# Patient Record
Sex: MALE | Race: OTHER | URBAN - METROPOLITAN AREA
[De-identification: names, ages, dates, MRNs, and addresses within clinical notes are randomized per-mention and may not be internally consistent; named-entity substitution may affect disease eponyms.]

---

## 2017-12-18 ENCOUNTER — OFFICE VISIT (OUTPATIENT)
Dept: PODIATRY CLINIC | Facility: CLINIC | Age: 52
End: 2017-12-18

## 2017-12-18 DIAGNOSIS — M72.2 PLANTAR FASCIITIS OF LEFT FOOT: Primary | ICD-10-CM

## 2017-12-18 PROCEDURE — L3060 FOOT ARCH SUPP LONGITUD/META: HCPCS | Performed by: PODIATRIST

## 2017-12-18 PROCEDURE — 99203 OFFICE O/P NEW LOW 30 MIN: CPT | Performed by: PODIATRIST

## 2017-12-18 PROCEDURE — 99212 OFFICE O/P EST SF 10 MIN: CPT | Performed by: PODIATRIST

## 2017-12-18 RX ORDER — ATORVASTATIN CALCIUM 20 MG/1
TABLET, FILM COATED ORAL
Refills: 2 | COMMUNITY
Start: 2017-12-16

## 2017-12-18 RX ORDER — IBUPROFEN 800 MG/1
TABLET ORAL
Refills: 0 | COMMUNITY
Start: 2017-11-11

## 2017-12-18 RX ORDER — PIOGLITAZONEHYDROCHLORIDE 15 MG/1
TABLET ORAL
Refills: 2 | COMMUNITY
Start: 2017-12-16

## 2017-12-18 RX ORDER — INFLUENZA VIRUS VACCINE 15; 15; 15; 15 UG/.5ML; UG/.5ML; UG/.5ML; UG/.5ML
SUSPENSION INTRAMUSCULAR
Refills: 0 | COMMUNITY
Start: 2017-12-01

## 2017-12-18 RX ORDER — DICLOFENAC POTASSIUM 50 MG/1
TABLET, FILM COATED ORAL
Refills: 0 | COMMUNITY
Start: 2017-10-30

## 2017-12-18 RX ORDER — IBUPROFEN 800 MG/1
800 TABLET ORAL
Qty: 60 TABLET | Refills: 1 | Status: SHIPPED | OUTPATIENT
Start: 2017-12-18 | End: 2018-01-10

## 2017-12-18 NOTE — PROGRESS NOTES
HPI:    Patient ID: Dima Gonzalez is a 46year old male. HPI  This 49-year-old male presents as a new patient to me and states that he is self-referred. His chief complaint is a painful left heel that has been present for about 3 months.   The pain is un medication, potential concerns and expectations. He was also instructed to ice twice every evening for 15 minutes. Patient indicates an understanding of my instructions I plan to reevaluate this patient in approximately 3 weeks with other considerations.

## 2018-01-10 ENCOUNTER — OFFICE VISIT (OUTPATIENT)
Dept: PODIATRY CLINIC | Facility: CLINIC | Age: 53
End: 2018-01-10

## 2018-01-10 DIAGNOSIS — M72.2 PLANTAR FASCIITIS OF LEFT FOOT: Primary | ICD-10-CM

## 2018-01-10 PROCEDURE — 99212 OFFICE O/P EST SF 10 MIN: CPT | Performed by: PODIATRIST

## 2018-01-10 RX ORDER — IBUPROFEN 800 MG/1
800 TABLET ORAL
Qty: 60 TABLET | Refills: 1 | Status: SHIPPED | OUTPATIENT
Start: 2018-01-10

## 2018-01-10 NOTE — PROGRESS NOTES
HPI:    Patient ID: Nida Molina is a 46year old male. HPI  This 35-year-old male presents for follow-up in reference to left arch and heel pain. He probably is half better as he would state it.   When he takes the medication as directed he has no pain

## 2018-03-27 ENCOUNTER — OFFICE VISIT (OUTPATIENT)
Dept: PODIATRY CLINIC | Facility: CLINIC | Age: 53
End: 2018-03-27

## 2018-03-27 VITALS — RESPIRATION RATE: 16 BRPM | DIASTOLIC BLOOD PRESSURE: 73 MMHG | SYSTOLIC BLOOD PRESSURE: 118 MMHG | HEART RATE: 82 BPM

## 2018-03-27 DIAGNOSIS — M72.2 PLANTAR FASCIITIS OF LEFT FOOT: Primary | ICD-10-CM

## 2018-03-27 PROCEDURE — 99213 OFFICE O/P EST LOW 20 MIN: CPT | Performed by: PODIATRIST

## 2018-03-27 PROCEDURE — 20550 NJX 1 TENDON SHEATH/LIGAMENT: CPT | Performed by: PODIATRIST

## 2018-03-27 NOTE — PROGRESS NOTES
HPI:    Patient ID: Billy Ramos is a 46year old male. HPI  This 44-year-old male presents for follow-up care in reference to left heel pain. I have not seen this patient in 2-1/2 months.   Although he was somewhat improved he continues to have daily p

## 2018-03-27 NOTE — PROGRESS NOTES
Per verbal order from Dr. Theresa Dorantes, draw up 0.5ml of 0.5% Marcaine and 20 mg of Depo-Medrol for cortisone injection to left heel.  Tori Hamm

## 2018-05-01 ENCOUNTER — OFFICE VISIT (OUTPATIENT)
Dept: PODIATRY CLINIC | Facility: CLINIC | Age: 53
End: 2018-05-01

## 2018-05-01 DIAGNOSIS — M72.2 PLANTAR FASCIITIS OF LEFT FOOT: Primary | ICD-10-CM

## 2018-05-01 PROCEDURE — 99212 OFFICE O/P EST SF 10 MIN: CPT | Performed by: PODIATRIST

## 2018-05-01 NOTE — PROGRESS NOTES
HPI:    Patient ID: Justino Iniguez is a 46year old male. HPI  This 55-year-old male presents for follow-up in reference to left heel pain. Patient has experienced a dramatic and significant improvement as a result of the cortisone injection.   He states
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
## 2021-07-21 NOTE — CM/SW NOTE
Yisel Ybarra in security came to [facility] office stating patient in [facility] requesting cab number. ERCM met with patient and confirmed patient has money for cab - patient confirmed he does.  Obtained pt's address he would like to be discharged home to - [address] [place] [address]

## 2021-07-21 NOTE — ED QUICK NOTES
Per dr. Christianson Mainland ambulate at 2300.  If ambulatory with steady gait pt may go home via taxi

## 2021-07-21 NOTE — ED PROVIDER NOTES
Patient alert oriented x3. Patient with steady gait. Patient okay to be discharged home. Patient instructed to cut down his drinking.

## 2022-04-12 ENCOUNTER — TELEPHONE (OUTPATIENT)
Dept: HEMATOLOGY/ONCOLOGY | Facility: HOSPITAL | Age: 57
End: 2022-04-12

## 2022-04-12 NOTE — TELEPHONE ENCOUNTER
Received referral and medicals [facility]. Please advise of a consult date for this patient within 5-7 days. DX-Leukocytosis disorder  & Thrombocytopenia & hyperlipidemia.

## 2022-04-13 ENCOUNTER — TELEPHONE (OUTPATIENT)
Dept: HEMATOLOGY/ONCOLOGY | Facility: HOSPITAL | Age: 57
End: 2022-04-13

## 2022-04-19 ENCOUNTER — LAB ENCOUNTER (OUTPATIENT)
Dept: LAB | Facility: HOSPITAL | Age: 57
End: 2022-04-19
Attending: STUDENT IN AN ORGANIZED HEALTH CARE EDUCATION/TRAINING PROGRAM
Payer: COMMERCIAL

## 2022-04-19 ENCOUNTER — OFFICE VISIT (OUTPATIENT)
Dept: HEMATOLOGY/ONCOLOGY | Facility: HOSPITAL | Age: 57
End: 2022-04-19
Attending: STUDENT IN AN ORGANIZED HEALTH CARE EDUCATION/TRAINING PROGRAM
Payer: COMMERCIAL

## 2022-04-19 VITALS
BODY MASS INDEX: 25.33 KG/M2 | RESPIRATION RATE: 16 BRPM | HEIGHT: 65 IN | SYSTOLIC BLOOD PRESSURE: 124 MMHG | WEIGHT: 152 LBS | HEART RATE: 82 BPM | DIASTOLIC BLOOD PRESSURE: 75 MMHG | OXYGEN SATURATION: 96 % | TEMPERATURE: 97 F

## 2022-04-19 DIAGNOSIS — D69.6 THROMBOCYTOPENIA (HCC): ICD-10-CM

## 2022-04-19 DIAGNOSIS — D72.829 LEUKOCYTOSIS, UNSPECIFIED TYPE: ICD-10-CM

## 2022-04-19 DIAGNOSIS — D72.820 LYMPHOCYTOSIS: Primary | ICD-10-CM

## 2022-04-19 DIAGNOSIS — D72.820 LYMPHOCYTOSIS: ICD-10-CM

## 2022-04-19 LAB
ALBUMIN SERPL-MCNC: 3.8 G/DL (ref 3.4–5)
ALBUMIN/GLOB SERPL: 1.1 {RATIO} (ref 1–2)
ALP LIVER SERPL-CCNC: 103 U/L
ALT SERPL-CCNC: 25 U/L
ANION GAP SERPL CALC-SCNC: 9 MMOL/L (ref 0–18)
AST SERPL-CCNC: 17 U/L (ref 15–37)
BASOPHILS # BLD AUTO: 0.07 X10(3) UL (ref 0–0.2)
BASOPHILS NFR BLD AUTO: 0.6 %
BILIRUB SERPL-MCNC: 0.3 MG/DL (ref 0.1–2)
BUN BLD-MCNC: 8 MG/DL (ref 7–18)
BUN/CREAT SERPL: 11.8 (ref 10–20)
CALCIUM BLD-MCNC: 9.3 MG/DL (ref 8.5–10.1)
CHLORIDE SERPL-SCNC: 106 MMOL/L (ref 98–112)
CO2 SERPL-SCNC: 25 MMOL/L (ref 21–32)
CREAT BLD-MCNC: 0.68 MG/DL
DEPRECATED RDW RBC AUTO: 45.3 FL (ref 35.1–46.3)
EOSINOPHIL # BLD AUTO: 0.2 X10(3) UL (ref 0–0.7)
EOSINOPHIL NFR BLD AUTO: 1.7 %
ERYTHROCYTE [DISTWIDTH] IN BLOOD BY AUTOMATED COUNT: 13.5 % (ref 11–15)
ERYTHROCYTE [SEDIMENTATION RATE] IN BLOOD: 16 MM/HR
FASTING STATUS PATIENT QL REPORTED: YES
FOLATE SERPL-MCNC: 6.1 NG/ML (ref 8.7–?)
GLOBULIN PLAS-MCNC: 3.5 G/DL (ref 2.8–4.4)
GLUCOSE BLD-MCNC: 124 MG/DL (ref 70–99)
HAV AB SER QL IA: REACTIVE
HAV IGM SER QL: NONREACTIVE
HBV CORE AB SERPL QL IA: NONREACTIVE
HBV SURFACE AB SER QL: REACTIVE
HBV SURFACE AB SERPL IA-ACNC: 644.53 MIU/ML
HBV SURFACE AG SERPL QL IA: NONREACTIVE
HCT VFR BLD AUTO: 44.8 %
HCV AB SERPL QL IA: NONREACTIVE
HGB BLD-MCNC: 14.2 G/DL
IMM GRANULOCYTES # BLD AUTO: 0.04 X10(3) UL (ref 0–1)
IMM GRANULOCYTES NFR BLD: 0.3 %
LDH SERPL L TO P-CCNC: 191 U/L
LYMPHOCYTES # BLD AUTO: 2.66 X10(3) UL (ref 1–4)
LYMPHOCYTES NFR BLD AUTO: 22.5 %
MCH RBC QN AUTO: 28.7 PG (ref 26–34)
MCHC RBC AUTO-ENTMCNC: 31.7 G/DL (ref 31–37)
MCV RBC AUTO: 90.5 FL
MONOCYTES # BLD AUTO: 0.82 X10(3) UL (ref 0.1–1)
MONOCYTES NFR BLD AUTO: 6.9 %
NEUTROPHILS # BLD AUTO: 8.03 X10 (3) UL (ref 1.5–7.7)
NEUTROPHILS # BLD AUTO: 8.03 X10(3) UL (ref 1.5–7.7)
NEUTROPHILS NFR BLD AUTO: 68 %
OSMOLALITY SERPL CALC.SUM OF ELEC: 290 MOSM/KG (ref 275–295)
PLATELET # BLD AUTO: 195 10(3)UL (ref 150–450)
POTASSIUM SERPL-SCNC: 4.6 MMOL/L (ref 3.5–5.1)
PROT SERPL-MCNC: 7.3 G/DL (ref 6.4–8.2)
RBC # BLD AUTO: 4.95 X10(6)UL
SODIUM SERPL-SCNC: 140 MMOL/L (ref 136–145)
VIT B12 SERPL-MCNC: 292 PG/ML (ref 193–986)
WBC # BLD AUTO: 11.8 X10(3) UL (ref 4–11)

## 2022-04-19 PROCEDURE — 86803 HEPATITIS C AB TEST: CPT

## 2022-04-19 PROCEDURE — 83921 ORGANIC ACID SINGLE QUANT: CPT

## 2022-04-19 PROCEDURE — 80503 PATH CLIN CONSLTJ SF 5-20: CPT

## 2022-04-19 PROCEDURE — 82746 ASSAY OF FOLIC ACID SERUM: CPT

## 2022-04-19 PROCEDURE — 36415 COLL VENOUS BLD VENIPUNCTURE: CPT

## 2022-04-19 PROCEDURE — 82607 VITAMIN B-12: CPT

## 2022-04-19 PROCEDURE — 85652 RBC SED RATE AUTOMATED: CPT

## 2022-04-19 PROCEDURE — 99244 OFF/OP CNSLTJ NEW/EST MOD 40: CPT | Performed by: STUDENT IN AN ORGANIZED HEALTH CARE EDUCATION/TRAINING PROGRAM

## 2022-04-19 PROCEDURE — 80053 COMPREHEN METABOLIC PANEL: CPT

## 2022-04-19 PROCEDURE — 88189 FLOWCYTOMETRY/READ 16 & >: CPT

## 2022-04-19 PROCEDURE — 86708 HEPATITIS A ANTIBODY: CPT

## 2022-04-19 PROCEDURE — 86704 HEP B CORE ANTIBODY TOTAL: CPT

## 2022-04-19 PROCEDURE — 85025 COMPLETE CBC W/AUTO DIFF WBC: CPT

## 2022-04-19 PROCEDURE — 85060 BLOOD SMEAR INTERPRETATION: CPT

## 2022-04-19 PROCEDURE — 86706 HEP B SURFACE ANTIBODY: CPT

## 2022-04-19 PROCEDURE — 86709 HEPATITIS A IGM ANTIBODY: CPT

## 2022-04-19 PROCEDURE — 83615 LACTATE (LD) (LDH) ENZYME: CPT

## 2022-04-19 PROCEDURE — 88185 FLOWCYTOMETRY/TC ADD-ON: CPT

## 2022-04-19 PROCEDURE — 87389 HIV-1 AG W/HIV-1&-2 AB AG IA: CPT

## 2022-04-19 PROCEDURE — 87340 HEPATITIS B SURFACE AG IA: CPT

## 2022-04-19 PROCEDURE — 88184 FLOWCYTOMETRY/ TC 1 MARKER: CPT

## 2022-04-20 LAB
CD10 CELLS NFR SPEC: <1 %
CD11C CELLS NFR SPEC: 4 %
CD14 CELLS NFR SPEC: 1 %
CD19 CELLS NFR SPEC: 22 %
CD19/CD10 CELLS: <1 %
CD20 CELLS NFR SPEC: 23 %
CD22 CELLS NFR SPEC: 20 %
CD23 CELLS NFR SPEC: <1 %
CD3 CELLS NFR SPEC: 70 %
CD3+CD4+ CELLS NFR SPEC: 50 %
CD3+CD4+ CELLS/CD3+CD8+ CLL SPEC: 2.5
CD3+CD8+ CELLS NFR SPEC: 20 %
CD33 CELLS NFR SPEC: 51 %
CD33/CD34 CELLS: 50 %
CD34 CELLS NFR SPEC: 82 %
CD45 CELLS NFR SPEC: 100 %
CD5 CELLS NFR SPEC: 70 %
CD5/CD19 CELLS: 2 %
CD56 CELLS NFR SPEC: 6 %
CD7 CELLS NFR SPEC: 56 %
CELL SURF KAPPA/LAMBDA RATIO: 1.5
CELL SURF LAMBDA LIGHT CHAIN: 8 %
CELL SURFACE KAPPA LIGHT CHAIN: 12 %
FMC7 CELLS NFR SPEC: 17 %

## 2022-04-23 LAB — MMA: 0.1 UMOL/L

## 2022-04-26 ENCOUNTER — TELEPHONE (OUTPATIENT)
Dept: HEMATOLOGY/ONCOLOGY | Facility: HOSPITAL | Age: 57
End: 2022-04-26

## 2022-04-26 NOTE — TELEPHONE ENCOUNTER
Called pt to review results. Labs show mild neutrophilia, repeat platelet count wnl at 195. Folic level low at 6.1, so advised pt start an over the counter folic acid once daily supplement and he expressed an understanding. Mild leukocytosis likely related to ongoing smoking, flow cytometry negative for a monoclonal process. Will have the pt return in appx 3 months with repeat CBC, folate, B12.      Radha Padron, [facility]

## 2022-07-26 ENCOUNTER — TELEPHONE (OUTPATIENT)
Dept: HEMATOLOGY/ONCOLOGY | Facility: HOSPITAL | Age: 57
End: 2022-07-26

## 2022-07-26 NOTE — TELEPHONE ENCOUNTER
Writer called patient as reminder for appointment scheduled on Friday 7/29. Patient stated that he is currently in [place] and wont be back til after scheduled appointment. Appointment was rescheduled to [facility] 8/8 @ 0930. Patient aware that he will need labs drawn as an outpatient a few days before.

## 2022-08-03 ENCOUNTER — LAB ENCOUNTER (OUTPATIENT)
Dept: LAB | Facility: HOSPITAL | Age: 57
End: 2022-08-03
Attending: STUDENT IN AN ORGANIZED HEALTH CARE EDUCATION/TRAINING PROGRAM
Payer: COMMERCIAL

## 2022-08-03 DIAGNOSIS — D72.820 LYMPHOCYTOSIS: ICD-10-CM

## 2022-08-03 LAB
BASOPHILS # BLD AUTO: 0.09 X10(3) UL (ref 0–0.2)
BASOPHILS NFR BLD AUTO: 0.8 %
DEPRECATED RDW RBC AUTO: 50.1 FL (ref 35.1–46.3)
EOSINOPHIL # BLD AUTO: 0.11 X10(3) UL (ref 0–0.7)
EOSINOPHIL NFR BLD AUTO: 1 %
ERYTHROCYTE [DISTWIDTH] IN BLOOD BY AUTOMATED COUNT: 14.6 % (ref 11–15)
FOLATE SERPL-MCNC: 7.4 NG/ML (ref 8.7–?)
HCT VFR BLD AUTO: 46.2 %
HGB BLD-MCNC: 14.5 G/DL
IMM GRANULOCYTES # BLD AUTO: 0.04 X10(3) UL (ref 0–1)
IMM GRANULOCYTES NFR BLD: 0.4 %
LYMPHOCYTES # BLD AUTO: 2.89 X10(3) UL (ref 1–4)
LYMPHOCYTES NFR BLD AUTO: 27.3 %
MCH RBC QN AUTO: 29.1 PG (ref 26–34)
MCHC RBC AUTO-ENTMCNC: 31.4 G/DL (ref 31–37)
MCV RBC AUTO: 92.8 FL
MONOCYTES # BLD AUTO: 0.72 X10(3) UL (ref 0.1–1)
MONOCYTES NFR BLD AUTO: 6.8 %
NEUTROPHILS # BLD AUTO: 6.74 X10 (3) UL (ref 1.5–7.7)
NEUTROPHILS # BLD AUTO: 6.74 X10(3) UL (ref 1.5–7.7)
NEUTROPHILS NFR BLD AUTO: 63.7 %
PLATELET # BLD AUTO: 203 10(3)UL (ref 150–450)
RBC # BLD AUTO: 4.98 X10(6)UL
VIT B12 SERPL-MCNC: 709 PG/ML (ref 193–986)
WBC # BLD AUTO: 10.6 X10(3) UL (ref 4–11)

## 2022-08-03 PROCEDURE — 82607 VITAMIN B-12: CPT

## 2022-08-03 PROCEDURE — 85025 COMPLETE CBC W/AUTO DIFF WBC: CPT

## 2022-08-03 PROCEDURE — 36415 COLL VENOUS BLD VENIPUNCTURE: CPT

## 2022-08-03 PROCEDURE — 82746 ASSAY OF FOLIC ACID SERUM: CPT

## 2022-08-08 ENCOUNTER — OFFICE VISIT (OUTPATIENT)
Dept: HEMATOLOGY/ONCOLOGY | Facility: HOSPITAL | Age: 57
End: 2022-08-08
Attending: STUDENT IN AN ORGANIZED HEALTH CARE EDUCATION/TRAINING PROGRAM
Payer: COMMERCIAL

## 2022-08-08 VITALS
WEIGHT: 137 LBS | TEMPERATURE: 98 F | HEIGHT: 65 IN | DIASTOLIC BLOOD PRESSURE: 75 MMHG | RESPIRATION RATE: 16 BRPM | SYSTOLIC BLOOD PRESSURE: 117 MMHG | OXYGEN SATURATION: 97 % | HEART RATE: 86 BPM | BODY MASS INDEX: 22.82 KG/M2

## 2022-08-08 DIAGNOSIS — E53.8 FOLATE DEFICIENCY: Primary | ICD-10-CM

## 2022-08-08 DIAGNOSIS — D72.820 LYMPHOCYTOSIS: ICD-10-CM

## 2022-08-08 DIAGNOSIS — D69.6 THROMBOCYTOPENIA (HCC): ICD-10-CM

## 2022-08-08 PROCEDURE — 99213 OFFICE O/P EST LOW 20 MIN: CPT | Performed by: STUDENT IN AN ORGANIZED HEALTH CARE EDUCATION/TRAINING PROGRAM

## 2022-12-28 ENCOUNTER — TELEPHONE (OUTPATIENT)
Dept: HEMATOLOGY/ONCOLOGY | Facility: HOSPITAL | Age: 57
End: 2022-12-28

## 2022-12-29 ENCOUNTER — TELEPHONE (OUTPATIENT)
Dept: HEMATOLOGY/ONCOLOGY | Facility: HOSPITAL | Age: 57
End: 2022-12-29

## 2023-01-05 ENCOUNTER — TELEPHONE (OUTPATIENT)
Dept: HEMATOLOGY/ONCOLOGY | Facility: HOSPITAL | Age: 58
End: 2023-01-05

## 2023-01-12 ENCOUNTER — TELEPHONE (OUTPATIENT)
Dept: HEMATOLOGY/ONCOLOGY | Facility: HOSPITAL | Age: 58
End: 2023-01-12

## 2023-01-20 ENCOUNTER — TELEPHONE (OUTPATIENT)
Dept: HEMATOLOGY/ONCOLOGY | Facility: HOSPITAL | Age: 58
End: 2023-01-20

## 2023-02-06 ENCOUNTER — TELEPHONE (OUTPATIENT)
Dept: HEMATOLOGY/ONCOLOGY | Facility: HOSPITAL | Age: 58
End: 2023-02-06

## 2023-02-06 NOTE — TELEPHONE ENCOUNTER
Left voicemail for patient to call back and reschedule appointment. This was the 6th attempt since 12/28 in attempt to reschedule appointment as Dr. Anna Pisano is out of town for date of schedule appointment on 2/7. Writer informed patient on message that appointment is canceled. Number for clinic provided for patient to call and reschedule.

## 2023-02-07 ENCOUNTER — APPOINTMENT (OUTPATIENT)
Dept: HEMATOLOGY/ONCOLOGY | Facility: HOSPITAL | Age: 58
End: 2023-02-07
Attending: STUDENT IN AN ORGANIZED HEALTH CARE EDUCATION/TRAINING PROGRAM
Payer: COMMERCIAL

## 2023-02-24 ENCOUNTER — TELEPHONE (OUTPATIENT)
Dept: HEMATOLOGY/ONCOLOGY | Facility: HOSPITAL | Age: 58
End: 2023-02-24

## 2023-09-05 ENCOUNTER — HOSPITAL ENCOUNTER (INPATIENT)
Facility: HOSPITAL | Age: 58
LOS: 2 days | Discharge: HOME | End: 2023-09-07
Attending: EMERGENCY MEDICINE | Admitting: HOSPITALIST
Payer: COMMERCIAL

## 2023-09-05 ENCOUNTER — APPOINTMENT (OUTPATIENT)
Dept: CT IMAGING | Facility: HOSPITAL | Age: 58
End: 2023-09-05
Attending: EMERGENCY MEDICINE
Payer: COMMERCIAL

## 2023-09-05 ENCOUNTER — APPOINTMENT (OUTPATIENT)
Dept: GENERAL RADIOLOGY | Facility: HOSPITAL | Age: 58
End: 2023-09-05
Attending: EMERGENCY MEDICINE
Payer: COMMERCIAL

## 2023-09-05 DIAGNOSIS — R11.2 NAUSEA AND VOMITING, UNSPECIFIED VOMITING TYPE: Primary | ICD-10-CM

## 2023-09-05 DIAGNOSIS — D72.829 LEUKOCYTOSIS, UNSPECIFIED TYPE: ICD-10-CM

## 2023-09-05 DIAGNOSIS — F10.10 ALCOHOL ABUSE: ICD-10-CM

## 2023-09-05 DIAGNOSIS — E86.0 DEHYDRATION: ICD-10-CM

## 2023-09-05 LAB
ALBUMIN SERPL-MCNC: 4.4 G/DL (ref 3.4–5)
ALP LIVER SERPL-CCNC: 151 U/L
ALT SERPL-CCNC: 75 U/L
AMPHET UR QL SCN: NEGATIVE
ANION GAP SERPL CALC-SCNC: 29 MMOL/L (ref 0–18)
AST SERPL-CCNC: 47 U/L (ref 15–37)
BASOPHILS # BLD AUTO: 0.04 X10(3) UL (ref 0–0.2)
BASOPHILS NFR BLD AUTO: 0.2 %
BENZODIAZ UR QL SCN: NEGATIVE
BILIRUB DIRECT SERPL-MCNC: 0.2 MG/DL (ref 0–0.2)
BILIRUB SERPL-MCNC: 0.8 MG/DL (ref 0.1–2)
BILIRUB UR QL: NEGATIVE
BUN BLD-MCNC: 21 MG/DL (ref 7–18)
BUN/CREAT SERPL: 16.8 (ref 10–20)
CALCIUM BLD-MCNC: 9.3 MG/DL (ref 8.5–10.1)
CANNABINOIDS UR QL SCN: NEGATIVE
CHLORIDE SERPL-SCNC: 91 MMOL/L (ref 98–112)
CLARITY UR: CLEAR
CO2 SERPL-SCNC: 13 MMOL/L (ref 21–32)
COCAINE UR QL: NEGATIVE
CREAT BLD-MCNC: 1.25 MG/DL
CREAT UR-SCNC: 21.3 MG/DL
DEPRECATED RDW RBC AUTO: 43.6 FL (ref 35.1–46.3)
EGFRCR SERPLBLD CKD-EPI 2021: 67 ML/MIN/1.73M2 (ref 60–?)
EOSINOPHIL # BLD AUTO: 0.03 X10(3) UL (ref 0–0.7)
EOSINOPHIL NFR BLD AUTO: 0.1 %
ERYTHROCYTE [DISTWIDTH] IN BLOOD BY AUTOMATED COUNT: 13.5 % (ref 11–15)
EST. AVERAGE GLUCOSE BLD GHB EST-MCNC: 171 MG/DL (ref 68–126)
ETHANOL SERPL-MCNC: 66 MG/DL (ref ?–3)
GLUCOSE BLD-MCNC: 197 MG/DL (ref 70–99)
GLUCOSE BLDC GLUCOMTR-MCNC: 194 MG/DL (ref 70–99)
GLUCOSE BLDC GLUCOMTR-MCNC: 262 MG/DL (ref 70–99)
GLUCOSE BLDC GLUCOMTR-MCNC: 295 MG/DL (ref 70–99)
GLUCOSE UR-MCNC: NORMAL MG/DL
HBA1C MFR BLD: 7.6 % (ref ?–5.7)
HCT VFR BLD AUTO: 54.2 %
HGB BLD-MCNC: 17.9 G/DL
HYALINE CASTS #/AREA URNS AUTO: PRESENT /LPF
IMM GRANULOCYTES # BLD AUTO: 0.22 X10(3) UL (ref 0–1)
IMM GRANULOCYTES NFR BLD: 0.9 %
KETONES UR-MCNC: 100 MG/DL
LACTATE SERPL-SCNC: 1.9 MMOL/L (ref 0.4–2)
LACTATE SERPL-SCNC: 3.2 MMOL/L (ref 0.4–2)
LEUKOCYTE ESTERASE UR QL STRIP.AUTO: NEGATIVE
LYMPHOCYTES # BLD AUTO: 1.43 X10(3) UL (ref 1–4)
LYMPHOCYTES NFR BLD AUTO: 5.8 %
MAGNESIUM SERPL-MCNC: 2.3 MG/DL (ref 1.6–2.6)
MCH RBC QN AUTO: 29.2 PG (ref 26–34)
MCHC RBC AUTO-ENTMCNC: 33 G/DL (ref 31–37)
MCV RBC AUTO: 88.3 FL
MDMA UR QL SCN: NEGATIVE
MONOCYTES # BLD AUTO: 1.33 X10(3) UL (ref 0.1–1)
MONOCYTES NFR BLD AUTO: 5.4 %
NEUTROPHILS # BLD AUTO: 21.51 X10 (3) UL (ref 1.5–7.7)
NEUTROPHILS # BLD AUTO: 21.51 X10(3) UL (ref 1.5–7.7)
NEUTROPHILS NFR BLD AUTO: 87.6 %
NITRITE UR QL STRIP.AUTO: NEGATIVE
OPIATES UR QL SCN: NEGATIVE
OSMOLALITY SERPL CALC.SUM OF ELEC: 284 MOSM/KG (ref 275–295)
OXYCODONE UR QL SCN: NEGATIVE
PH UR: 5.5 [PH] (ref 5–8)
PLATELET # BLD AUTO: 150 10(3)UL (ref 150–450)
POTASSIUM SERPL-SCNC: 3.8 MMOL/L (ref 3.5–5.1)
PROCALCITONIN SERPL-MCNC: 0.31 NG/ML (ref ?–0.16)
PROT SERPL-MCNC: 9.4 G/DL (ref 6.4–8.2)
PROT UR-MCNC: 100 MG/DL
RBC # BLD AUTO: 6.14 X10(6)UL
SODIUM SERPL-SCNC: 133 MMOL/L (ref 136–145)
SP GR UR STRIP: 1.02 (ref 1–1.03)
UROBILINOGEN UR STRIP-ACNC: NORMAL
WBC # BLD AUTO: 24.6 X10(3) UL (ref 4–11)

## 2023-09-05 PROCEDURE — 99255 IP/OBS CONSLTJ NEW/EST HI 80: CPT | Performed by: INTERNAL MEDICINE

## 2023-09-05 PROCEDURE — 71045 X-RAY EXAM CHEST 1 VIEW: CPT | Performed by: EMERGENCY MEDICINE

## 2023-09-05 PROCEDURE — 74177 CT ABD & PELVIS W/CONTRAST: CPT | Performed by: EMERGENCY MEDICINE

## 2023-09-05 PROCEDURE — 99223 1ST HOSP IP/OBS HIGH 75: CPT | Performed by: HOSPITALIST

## 2023-09-05 RX ORDER — DEXTROSE MONOHYDRATE 25 G/50ML
50 INJECTION, SOLUTION INTRAVENOUS
Status: DISCONTINUED | OUTPATIENT
Start: 2023-09-05 | End: 2023-09-07

## 2023-09-05 RX ORDER — HEPARIN SODIUM 5000 [USP'U]/ML
5000 INJECTION, SOLUTION INTRAVENOUS; SUBCUTANEOUS EVERY 12 HOURS SCHEDULED
Status: DISCONTINUED | OUTPATIENT
Start: 2023-09-05 | End: 2023-09-07

## 2023-09-05 RX ORDER — ACETAMINOPHEN 500 MG
500 TABLET ORAL EVERY 4 HOURS PRN
Status: DISCONTINUED | OUTPATIENT
Start: 2023-09-05 | End: 2023-09-07

## 2023-09-05 RX ORDER — LORAZEPAM 1 MG/1
1 TABLET ORAL
Status: DISCONTINUED | OUTPATIENT
Start: 2023-09-05 | End: 2023-09-07

## 2023-09-05 RX ORDER — SODIUM CHLORIDE 9 MG/ML
INJECTION, SOLUTION INTRAVENOUS CONTINUOUS
Status: DISCONTINUED | OUTPATIENT
Start: 2023-09-05 | End: 2023-09-07

## 2023-09-05 RX ORDER — ONDANSETRON 2 MG/ML
4 INJECTION INTRAMUSCULAR; INTRAVENOUS EVERY 6 HOURS PRN
Status: DISCONTINUED | OUTPATIENT
Start: 2023-09-05 | End: 2023-09-07

## 2023-09-05 RX ORDER — MORPHINE SULFATE 2 MG/ML
2 INJECTION, SOLUTION INTRAMUSCULAR; INTRAVENOUS EVERY 4 HOURS PRN
Status: DISCONTINUED | OUTPATIENT
Start: 2023-09-05 | End: 2023-09-07

## 2023-09-05 RX ORDER — LORAZEPAM 2 MG/ML
2 INJECTION INTRAMUSCULAR
Status: DISCONTINUED | OUTPATIENT
Start: 2023-09-05 | End: 2023-09-07

## 2023-09-05 RX ORDER — LORAZEPAM 2 MG/1
2 TABLET ORAL
Status: DISCONTINUED | OUTPATIENT
Start: 2023-09-05 | End: 2023-09-07

## 2023-09-05 RX ORDER — FOLIC ACID 1 MG/1
1 TABLET ORAL DAILY
Status: DISCONTINUED | OUTPATIENT
Start: 2023-09-05 | End: 2023-09-07

## 2023-09-05 RX ORDER — MELATONIN
100 DAILY
Status: DISCONTINUED | OUTPATIENT
Start: 2023-09-06 | End: 2023-09-07

## 2023-09-05 RX ORDER — PROCHLORPERAZINE EDISYLATE 5 MG/ML
5 INJECTION INTRAMUSCULAR; INTRAVENOUS EVERY 8 HOURS PRN
Status: DISCONTINUED | OUTPATIENT
Start: 2023-09-05 | End: 2023-09-05

## 2023-09-05 RX ORDER — ONDANSETRON 2 MG/ML
4 INJECTION INTRAMUSCULAR; INTRAVENOUS ONCE
Status: COMPLETED | OUTPATIENT
Start: 2023-09-05 | End: 2023-09-05

## 2023-09-05 RX ORDER — METOCLOPRAMIDE HYDROCHLORIDE 5 MG/ML
10 INJECTION INTRAMUSCULAR; INTRAVENOUS EVERY 6 HOURS PRN
Status: DISCONTINUED | OUTPATIENT
Start: 2023-09-05 | End: 2023-09-07

## 2023-09-05 RX ORDER — THIAMINE HYDROCHLORIDE 100 MG/ML
100 INJECTION, SOLUTION INTRAMUSCULAR; INTRAVENOUS ONCE
Status: COMPLETED | OUTPATIENT
Start: 2023-09-05 | End: 2023-09-05

## 2023-09-05 RX ORDER — LORAZEPAM 2 MG/ML
1 INJECTION INTRAMUSCULAR
Status: DISCONTINUED | OUTPATIENT
Start: 2023-09-05 | End: 2023-09-07

## 2023-09-05 RX ORDER — NICOTINE POLACRILEX 4 MG
15 LOZENGE BUCCAL
Status: DISCONTINUED | OUTPATIENT
Start: 2023-09-05 | End: 2023-09-07

## 2023-09-05 RX ORDER — MULTIPLE VITAMINS W/ MINERALS TAB 9MG-400MCG
1 TAB ORAL DAILY
Status: DISCONTINUED | OUTPATIENT
Start: 2023-09-05 | End: 2023-09-07

## 2023-09-05 RX ORDER — NICOTINE POLACRILEX 4 MG
30 LOZENGE BUCCAL
Status: DISCONTINUED | OUTPATIENT
Start: 2023-09-05 | End: 2023-09-07

## 2023-09-05 NOTE — ED QUICK NOTES
Orders for admission, patient is aware of plan and ready to go upstairs. Any questions, please call ED RN David Hurley at extension [phone]. Patient Covid vaccination status: Unvaccinated     COVID Test Ordered in ED: None    COVID Suspicion at Admission: N/A    Running Infusions:  None    Mental Status/LOC at time of transport:  AxOx4    Other pertinent information:   CIWA score: N/A   NIH score:  N/A
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
## 2023-09-05 NOTE — ED INITIAL ASSESSMENT (HPI)
Pt to ED A&O x 4 via EMS for c/o generalized abdominal pain & n/v since Friday. Pt w/ hx T2DM, take Metformin. Pt denies diarrhea, chest pain, AARON, fevers/chills, dysuria. Pt reporting he has been drinking about a pint of whiskey every day for the last 15 days. Last drink on Sunday.

## 2023-09-06 ENCOUNTER — APPOINTMENT (OUTPATIENT)
Dept: GENERAL RADIOLOGY | Facility: HOSPITAL | Age: 58
End: 2023-09-06
Attending: HOSPITALIST
Payer: COMMERCIAL

## 2023-09-06 LAB
ALBUMIN SERPL-MCNC: 3.1 G/DL (ref 3.4–5)
ALBUMIN/GLOB SERPL: 0.8 {RATIO} (ref 1–2)
ALP LIVER SERPL-CCNC: 90 U/L
ALT SERPL-CCNC: 41 U/L
ANION GAP SERPL CALC-SCNC: 11 MMOL/L (ref 0–18)
AST SERPL-CCNC: 30 U/L (ref 15–37)
ATRIAL RATE: 126 BPM
BASOPHILS # BLD AUTO: 0.02 X10(3) UL (ref 0–0.2)
BASOPHILS NFR BLD AUTO: 0.1 %
BILIRUB SERPL-MCNC: 1.1 MG/DL (ref 0.1–2)
BUN BLD-MCNC: 13 MG/DL (ref 7–18)
BUN/CREAT SERPL: 14.8 (ref 10–20)
CALCIUM BLD-MCNC: 7.9 MG/DL (ref 8.5–10.1)
CHLORIDE SERPL-SCNC: 106 MMOL/L (ref 98–112)
CO2 SERPL-SCNC: 18 MMOL/L (ref 21–32)
CREAT BLD-MCNC: 0.88 MG/DL
DEPRECATED RDW RBC AUTO: 43.8 FL (ref 35.1–46.3)
EGFRCR SERPLBLD CKD-EPI 2021: 100 ML/MIN/1.73M2 (ref 60–?)
EOSINOPHIL # BLD AUTO: 0 X10(3) UL (ref 0–0.7)
EOSINOPHIL NFR BLD AUTO: 0 %
ERYTHROCYTE [DISTWIDTH] IN BLOOD BY AUTOMATED COUNT: 13.6 % (ref 11–15)
GLOBULIN PLAS-MCNC: 3.9 G/DL (ref 2.8–4.4)
GLUCOSE BLD-MCNC: 174 MG/DL (ref 70–99)
GLUCOSE BLDC GLUCOMTR-MCNC: 161 MG/DL (ref 70–99)
GLUCOSE BLDC GLUCOMTR-MCNC: 174 MG/DL (ref 70–99)
GLUCOSE BLDC GLUCOMTR-MCNC: 178 MG/DL (ref 70–99)
GLUCOSE BLDC GLUCOMTR-MCNC: 207 MG/DL (ref 70–99)
HCT VFR BLD AUTO: 43.2 %
HGB BLD-MCNC: 14.3 G/DL
IMM GRANULOCYTES # BLD AUTO: 0.05 X10(3) UL (ref 0–1)
IMM GRANULOCYTES NFR BLD: 0.3 %
LYMPHOCYTES # BLD AUTO: 2.31 X10(3) UL (ref 1–4)
LYMPHOCYTES NFR BLD AUTO: 15.5 %
MCH RBC QN AUTO: 29 PG (ref 26–34)
MCHC RBC AUTO-ENTMCNC: 33.1 G/DL (ref 31–37)
MCV RBC AUTO: 87.6 FL
MONOCYTES # BLD AUTO: 1.14 X10(3) UL (ref 0.1–1)
MONOCYTES NFR BLD AUTO: 7.7 %
NEUTROPHILS # BLD AUTO: 11.35 X10 (3) UL (ref 1.5–7.7)
NEUTROPHILS # BLD AUTO: 11.35 X10(3) UL (ref 1.5–7.7)
NEUTROPHILS NFR BLD AUTO: 76.4 %
OSMOLALITY SERPL CALC.SUM OF ELEC: 284 MOSM/KG (ref 275–295)
P AXIS: 57 DEGREES
P-R INTERVAL: 142 MS
PLATELET # BLD AUTO: 90 10(3)UL (ref 150–450)
POTASSIUM SERPL-SCNC: 4.4 MMOL/L (ref 3.5–5.1)
PROT SERPL-MCNC: 7 G/DL (ref 6.4–8.2)
Q-T INTERVAL: 320 MS
QRS DURATION: 76 MS
QTC CALCULATION (BEZET): 463 MS
R AXIS: 20 DEGREES
RBC # BLD AUTO: 4.93 X10(6)UL
SODIUM SERPL-SCNC: 135 MMOL/L (ref 136–145)
T AXIS: 49 DEGREES
VENTRICULAR RATE: 126 BPM
WBC # BLD AUTO: 14.9 X10(3) UL (ref 4–11)

## 2023-09-06 PROCEDURE — 71046 X-RAY EXAM CHEST 2 VIEWS: CPT | Performed by: HOSPITALIST

## 2023-09-06 PROCEDURE — 99233 SBSQ HOSP IP/OBS HIGH 50: CPT | Performed by: HOSPITALIST

## 2023-09-06 PROCEDURE — 99233 SBSQ HOSP IP/OBS HIGH 50: CPT | Performed by: INTERNAL MEDICINE

## 2023-09-06 NOTE — PLAN OF CARE
Pt had one episode of coffee ground emesis- MD notified. PRN antiemetic given. Denies pain. Pt on CIWA protocol. Pt on IV fluids per MD order. Safety precautions in place, call light within reach. Problem: Patient Centered Care  Goal: Patient preferences are identified and integrated in the patient's plan of care  Description: Interventions:  - What would you like us to know as we care for you?  Live at home with family  - Provide timely, complete, and accurate information to patient/family  - Incorporate patient and family knowledge, values, beliefs, and cultural backgrounds into the planning and delivery of care  - Encourage patient/family to participate in care and decision-making at the level they choose  - Honor patient and family perspectives and choices  Outcome: Progressing     Problem: Patient/Family Goals  Goal: Patient/Family Long Term Goal  Description: Patient's Long Term Goal: go home    Interventions:  - Monitor vital signs  - Monitor appropriate labs  - Monitor blood glucose levels  - Administer medications per order  - Pain management as needed  - Follow MD orders  - Diagnostics per orders  - Discharge planning     - See additional Care Plan goals for specific interventions  Outcome: Progressing  Goal: Patient/Family Short Term Goal  Description: Patient's Short Term Goal: to be nausea free    Interventions:   - PRN meds  -hydration  - See additional Care Plan goals for specific interventions  Outcome: Progressing     Problem: GASTROINTESTINAL - ADULT  Goal: Minimal or absence of nausea and vomiting  Description: INTERVENTIONS:  - Maintain adequate hydration with IV or PO as ordered and tolerated  - Nasogastric tube to low intermittent suction as ordered  - Evaluate effectiveness of ordered antiemetic medications  - Provide nonpharmacologic comfort measures as appropriate  - Advance diet as tolerated, if ordered  - Obtain nutritional consult as needed  - Evaluate fluid balance  Outcome: Progressing

## 2023-09-06 NOTE — PROGRESS NOTES
Received admission from ED- lives at home with spouse who is aware that he is in hospital. Accuchecks and CIWA protocol in place. Complaints of abdominal pain - MD notified.

## 2023-09-06 NOTE — PLAN OF CARE
Problem: Patient Centered Care  Goal: Patient preferences are identified and integrated in the patient's plan of care  Description: Interventions:  - What would you like us to know as we care for you?  Live at home with family  - Provide timely, complete, and accurate information to patient/family  - Incorporate patient and family knowledge, values, beliefs, and cultural backgrounds into the planning and delivery of care  - Encourage patient/family to participate in care and decision-making at the level they choose  - Honor patient and family perspectives and choices  Outcome: Progressing     Problem: Patient/Family Goals  Goal: Patient/Family Long Term Goal  Description: Patient's Long Term Goal: go home    Interventions:  - Monitor vital signs  - Monitor appropriate labs  - Monitor blood glucose levels  - Administer medications per order  - Pain management as needed  - Follow MD orders  - Diagnostics per orders  - Discharge planning     - See additional Care Plan goals for specific interventions  Outcome: Progressing  Goal: Patient/Family Short Term Goal  Description: Patient's Short Term Goal: to be nausea free    Interventions:   - PRN meds  -hydration  - See additional Care Plan goals for specific interventions  Outcome: Progressing     Problem: GASTROINTESTINAL - ADULT  Goal: Minimal or absence of nausea and vomiting  Description: INTERVENTIONS:  - Maintain adequate hydration with IV or PO as ordered and tolerated  - Nasogastric tube to low intermittent suction as ordered  - Evaluate effectiveness of ordered antiemetic medications  - Provide nonpharmacologic comfort measures as appropriate  - Advance diet as tolerated, if ordered  - Obtain nutritional consult as needed  - Evaluate fluid balance  Outcome: Progressing     Problem: Diabetes/Glucose Control  Goal: Glucose maintained within prescribed range  Description: INTERVENTIONS:  - Monitor Blood Glucose as ordered  - Assess for signs and symptoms of hyperglycemia and hypoglycemia  - Administer ordered medications to maintain glucose within target range  - Assess barriers to adequate nutritional intake and initiate nutrition consult as needed  - Instruct patient on self management of diabetes  Outcome: Progressing     Patient is presently resting in the bed. Alert x 4. Vital signs taken and stable. Ciwa is monitored every 2 hours per protocol. Patient remains calm and cooperative. Tolerates diet well. Intravenous fluids infusing and tolerated. Call light within reach at all times. Blood culture results reported to Dr. Han Avendaño. Patient is monitored for safety at all times.

## 2023-09-07 ENCOUNTER — ANESTHESIA (OUTPATIENT)
Dept: ENDOSCOPY | Facility: HOSPITAL | Age: 58
End: 2023-09-07
Payer: COMMERCIAL

## 2023-09-07 ENCOUNTER — ANESTHESIA EVENT (OUTPATIENT)
Dept: ENDOSCOPY | Facility: HOSPITAL | Age: 58
End: 2023-09-07
Payer: COMMERCIAL

## 2023-09-07 VITALS
TEMPERATURE: 98 F | HEART RATE: 93 BPM | OXYGEN SATURATION: 99 % | HEIGHT: 67 IN | SYSTOLIC BLOOD PRESSURE: 134 MMHG | WEIGHT: 129.81 LBS | RESPIRATION RATE: 18 BRPM | BODY MASS INDEX: 20.37 KG/M2 | DIASTOLIC BLOOD PRESSURE: 84 MMHG

## 2023-09-07 LAB
ALBUMIN SERPL-MCNC: 2.7 G/DL (ref 3.4–5)
ALBUMIN/GLOB SERPL: 0.8 {RATIO} (ref 1–2)
ALP LIVER SERPL-CCNC: 75 U/L
ALT SERPL-CCNC: 38 U/L
ANION GAP SERPL CALC-SCNC: 9 MMOL/L (ref 0–18)
AST SERPL-CCNC: 33 U/L (ref 15–37)
BASOPHILS # BLD AUTO: 0.03 X10(3) UL (ref 0–0.2)
BASOPHILS NFR BLD AUTO: 0.3 %
BILIRUB SERPL-MCNC: 0.9 MG/DL (ref 0.1–2)
BUN BLD-MCNC: 6 MG/DL (ref 7–18)
BUN/CREAT SERPL: 13 (ref 10–20)
CALCIUM BLD-MCNC: 7.8 MG/DL (ref 8.5–10.1)
CHLORIDE SERPL-SCNC: 104 MMOL/L (ref 98–112)
CO2 SERPL-SCNC: 23 MMOL/L (ref 21–32)
CREAT BLD-MCNC: 0.46 MG/DL
DEPRECATED RDW RBC AUTO: 41.1 FL (ref 35.1–46.3)
EGFRCR SERPLBLD CKD-EPI 2021: 122 ML/MIN/1.73M2 (ref 60–?)
EOSINOPHIL # BLD AUTO: 0.15 X10(3) UL (ref 0–0.7)
EOSINOPHIL NFR BLD AUTO: 1.4 %
ERYTHROCYTE [DISTWIDTH] IN BLOOD BY AUTOMATED COUNT: 13.3 % (ref 11–15)
GLOBULIN PLAS-MCNC: 3.2 G/DL (ref 2.8–4.4)
GLUCOSE BLD-MCNC: 143 MG/DL (ref 70–99)
GLUCOSE BLDC GLUCOMTR-MCNC: 119 MG/DL (ref 70–99)
GLUCOSE BLDC GLUCOMTR-MCNC: 122 MG/DL (ref 70–99)
GLUCOSE BLDC GLUCOMTR-MCNC: 123 MG/DL (ref 70–99)
GLUCOSE BLDC GLUCOMTR-MCNC: 124 MG/DL (ref 70–99)
GLUCOSE BLDC GLUCOMTR-MCNC: 131 MG/DL (ref 70–99)
HCT VFR BLD AUTO: 37 %
HGB BLD-MCNC: 12.6 G/DL
IMM GRANULOCYTES # BLD AUTO: 0.03 X10(3) UL (ref 0–1)
IMM GRANULOCYTES NFR BLD: 0.3 %
LYMPHOCYTES # BLD AUTO: 3.09 X10(3) UL (ref 1–4)
LYMPHOCYTES NFR BLD AUTO: 29.8 %
MAGNESIUM SERPL-MCNC: 1.8 MG/DL (ref 1.6–2.6)
MCH RBC QN AUTO: 28.8 PG (ref 26–34)
MCHC RBC AUTO-ENTMCNC: 34.1 G/DL (ref 31–37)
MCV RBC AUTO: 84.7 FL
MONOCYTES # BLD AUTO: 0.65 X10(3) UL (ref 0.1–1)
MONOCYTES NFR BLD AUTO: 6.3 %
NEUTROPHILS # BLD AUTO: 6.43 X10 (3) UL (ref 1.5–7.7)
NEUTROPHILS # BLD AUTO: 6.43 X10(3) UL (ref 1.5–7.7)
NEUTROPHILS NFR BLD AUTO: 61.9 %
OSMOLALITY SERPL CALC.SUM OF ELEC: 282 MOSM/KG (ref 275–295)
PLATELET # BLD AUTO: 85 10(3)UL (ref 150–450)
POTASSIUM SERPL-SCNC: 3 MMOL/L (ref 3.5–5.1)
PROT SERPL-MCNC: 5.9 G/DL (ref 6.4–8.2)
RBC # BLD AUTO: 4.37 X10(6)UL
SODIUM SERPL-SCNC: 136 MMOL/L (ref 136–145)
VIT B12 SERPL-MCNC: 657 PG/ML (ref 193–986)
WBC # BLD AUTO: 10.4 X10(3) UL (ref 4–11)

## 2023-09-07 PROCEDURE — 0DB68ZX EXCISION OF STOMACH, VIA NATURAL OR ARTIFICIAL OPENING ENDOSCOPIC, DIAGNOSTIC: ICD-10-PCS | Performed by: INTERNAL MEDICINE

## 2023-09-07 PROCEDURE — 99239 HOSP IP/OBS DSCHRG MGMT >30: CPT | Performed by: HOSPITALIST

## 2023-09-07 PROCEDURE — 43239 EGD BIOPSY SINGLE/MULTIPLE: CPT | Performed by: INTERNAL MEDICINE

## 2023-09-07 PROCEDURE — 99233 SBSQ HOSP IP/OBS HIGH 50: CPT | Performed by: NURSE PRACTITIONER

## 2023-09-07 RX ORDER — PANTOPRAZOLE SODIUM 40 MG/1
40 TABLET, DELAYED RELEASE ORAL
Qty: 60 TABLET | Refills: 0 | Status: SHIPPED | OUTPATIENT
Start: 2023-09-07

## 2023-09-07 RX ORDER — MELATONIN
100 DAILY
Qty: 30 TABLET | Refills: 0 | Status: SHIPPED | OUTPATIENT
Start: 2023-09-08

## 2023-09-07 RX ORDER — LIDOCAINE HYDROCHLORIDE 10 MG/ML
INJECTION, SOLUTION EPIDURAL; INFILTRATION; INTRACAUDAL; PERINEURAL AS NEEDED
Status: DISCONTINUED | OUTPATIENT
Start: 2023-09-07 | End: 2023-09-07 | Stop reason: SURG

## 2023-09-07 RX ORDER — MAGNESIUM SULFATE HEPTAHYDRATE 40 MG/ML
2 INJECTION, SOLUTION INTRAVENOUS ONCE
Status: COMPLETED | OUTPATIENT
Start: 2023-09-07 | End: 2023-09-07

## 2023-09-07 RX ORDER — POTASSIUM CHLORIDE 14.9 MG/ML
20 INJECTION INTRAVENOUS ONCE
Status: COMPLETED | OUTPATIENT
Start: 2023-09-07 | End: 2023-09-07

## 2023-09-07 RX ORDER — MULTIPLE VITAMINS W/ MINERALS TAB 9MG-400MCG
1 TAB ORAL DAILY
Qty: 30 TABLET | Refills: 0 | Status: SHIPPED | OUTPATIENT
Start: 2023-09-08

## 2023-09-07 RX ORDER — SODIUM CHLORIDE, SODIUM LACTATE, POTASSIUM CHLORIDE, CALCIUM CHLORIDE 600; 310; 30; 20 MG/100ML; MG/100ML; MG/100ML; MG/100ML
INJECTION, SOLUTION INTRAVENOUS CONTINUOUS PRN
Status: DISCONTINUED | OUTPATIENT
Start: 2023-09-07 | End: 2023-09-07 | Stop reason: SURG

## 2023-09-07 RX ORDER — FOLIC ACID 1 MG/1
1 TABLET ORAL DAILY
Qty: 30 TABLET | Refills: 0 | Status: SHIPPED | OUTPATIENT
Start: 2023-09-08

## 2023-09-07 RX ADMIN — LIDOCAINE HYDROCHLORIDE 50 MG: 10 INJECTION, SOLUTION EPIDURAL; INFILTRATION; INTRACAUDAL; PERINEURAL at 15:48:00

## 2023-09-07 RX ADMIN — SODIUM CHLORIDE, SODIUM LACTATE, POTASSIUM CHLORIDE, CALCIUM CHLORIDE: 600; 310; 30; 20 INJECTION, SOLUTION INTRAVENOUS at 15:55:00

## 2023-09-07 RX ADMIN — SODIUM CHLORIDE, SODIUM LACTATE, POTASSIUM CHLORIDE, CALCIUM CHLORIDE: 600; 310; 30; 20 INJECTION, SOLUTION INTRAVENOUS at 15:32:00

## 2023-09-07 NOTE — DISCHARGE SUMMARY
[facility]    Discharge Summary    Dl Llanos Patient Status:  Inpatient    1965 MRN [id number]   Location [facility] 5SW/SE Attending Fidencio Reyes MD   Hosp Day # 2 PCP None Pcp     Date of Admission: 2023 Disposition: Home or Self Care     Date of Discharge: 23      Admitting Diagnosis: Dehydration [E86.0]  Alcohol abuse [F10.10]  Leukocytosis, unspecified type [D72.829]  Nausea and vomiting, unspecified vomiting type [R11.2]    Hospital Discharge Diagnoses:  Alcohol Abuse    Lace+ Score: 28  59-90 High Risk  29-58 Medium Risk  0-28   Low Risk. TCM Follow-Up Recommendation:  LACE < 29: Low Risk of readmission after discharge from the hospital. No TCM follow-up needed. Problem List: Patient Active Problem List:     Nausea and vomiting, unspecified vomiting type     Dehydration     Alcohol abuse     Leukocytosis, unspecified type      Reason for Admission:   Intractable nausea and emesis  Leukocytosis  Alcohol Abuse  Thrombocytopenia    Physical Exam:   N/a      History of Present Illness:   Per Dr. [facility]  Patient is a 72-year-old [facility] male who has been binge drinking hard liquor for the last 15 days, last drink was last night. This morning, he started having retching nausea, vomiting, and dry heaves. He came into the emergency department for evaluation. CBC showed white blood cell count of 24.6, hemoglobin hemoconcentrated at 17.9, left shift. Chemistry showed glucose 197, sodium 133, potassium 3.8, chloride 91, bicarb 13, anion gap 29, BUN 21 and creatinine 1.25. Liver function tests: AST and ALT 47 and 75, alkaline phosphatase 151. Urinalysis showed ketones, but no evidence of urinary tract infection. CT scan of the abdomen showed distal esophageal reflux changes with thickening but no other acute findings. Chest x-ray showed no acute infiltrates. Procalcitonin is still pending. Magnesium is still pending.      Hospital Course:   Intractable nausea and emesis  -Likely related to heavy alcohol intake  -Appreciate GI consultation  -Tolerating diet  -cont IV PPI--> oral PPI--> bid on dc  -CT abdomen shows diffuse distal esophageal wall thickening  -upper endoscopy today shows grade c erosive esophagitis     Leukocytosis  -WBC 24 on admit-->14 now normalized  -low  grade fever   -repeat cxr pa/lat clear no pneumonia  -low grade fever  -started augmentin empirically, will stop  -+ bacteremia 1/2 staph not aureus, likely contaminant  -repeat cultures ngtsd     Alcohol Abuse  Counseled on etoh cessation    Thrombocytopenia  -likely related to etoh  -op f/u    Consultations: Gi    Procedures: endoscopy    Complications: n/a    Discharge Condition: Good    Discharge Medications:      Discharge Medications        START taking these medications        Instructions Prescription details   folic acid 1 MG Tabs  Commonly known as: Folvite  Start taking on: September 8, 2023      Take 1 tablet (1 mg total) by mouth daily. Quantity: 30 tablet  Refills: 0     multivitamin with minerals Tabs  Start taking on: September 8, 2023      Take 1 tablet by mouth daily. Quantity: 30 tablet  Refills: 0     pantoprazole 40 MG Tbec  Commonly known as: Protonix      Take 1 tablet (40 mg total) by mouth 2 (two) times daily before meals. Quantity: 60 tablet  Refills: 0     thiamine 100 MG Tabs  Commonly known as: Vitamin B1  Start taking on: September 8, 2023      Take 1 tablet (100 mg total) by mouth daily.    Quantity: 30 tablet  Refills: 0            CONTINUE taking these medications        Instructions Prescription details   atorvastatin 20 MG Tabs  Commonly known as: Lipitor       Refills: 2     Fluarix Quadrivalent 0.5 mL Tamy  Generic drug: influenza vaccine split quad      ADM 0.5ML IM UTD   Refills: 0     metFORMIN 500 MG Tabs  Commonly known as: Glucophage      TK 1 T PO  BID   Refills: 2     pioglitazone 15 MG Tabs  Commonly known as: Actos       Refills: 2            STOP taking these medications      diclofenac 50 MG Tabs  Commonly known as: Cataflam        ibuprofen 800 MG Tabs  Commonly known as: Motrin                  Where to Get Your Medications        These medications were sent to [place] [id number] #[id number] [facility], [address] [facility] AT [address] [place] [address], [phone], [address], [address] [address]      Phone: [phone]   folic acid 1 MG Tabs  multivitamin with minerals Tabs  pantoprazole 40 MG Tbec  thiamine 100 MG Tabs         Follow up Visits:  Follow-up with pcp in 1 week    Follow up Labs: n/a     Other Discharge Instructions: follow-up with ROULA Dumont MD  9/7/2023  5:07 PM    > 35 min

## 2023-09-07 NOTE — INTERVAL H&P NOTE
Pre-op Diagnosis: nausea; vomiting    The above referenced H&P was reviewed by Saint Stacks, MD on 9/7/2023, the patient was examined and no significant changes have occurred in the patient's condition since the H&P was performed. I discussed with the patient and/or legal representative the potential benefits, risks and side effects of this procedure; the likelihood of the patient achieving goals; and potential problems that might occur during recuperation. I discussed reasonable alternatives to the procedure, including risks, benefits and side effects related to the alternatives and risks related to not receiving this procedure. We will proceed with procedure as planned.

## 2023-09-07 NOTE — DISCHARGE INSTRUCTIONS
Follow-up with Primary care doctor in 1-2 weeks  Follow-up with Gastroenterology as instructed. Stop drinking alcohol   Please take all medications as directed by the doctor. Notify doctor immediately for fever, chills, vomiting or inability to tolerate meals.

## 2023-09-07 NOTE — CONSULTS
Spiritual Care Visit Note    Visited With: Patient    Writer report consulting with RN. Patient is alert and decisional. Writer offered empathic listening and support. Writer attempted to help patient complete HCPoA. Patient declined to complete HCPoA. Follow up as requested. Spiritual Care Taxonomy:    Intended Effects: Establish rapport and connectedness    Methods: Collaborate with care team member;Offer support    Interventions: Active listening; Ask guided questions;Assist someone with Advance Directives      Lit Florez, Ash[facility]   [id number]     On call  services [phone]

## 2023-09-07 NOTE — PLAN OF CARE
Discharge instructions given to patient. Patient was instructed to follow up with doctor for appointment. Saline lock removed. Patient verbalized understanding of discharge instructions. Patient discharge home.

## 2023-09-07 NOTE — PLAN OF CARE
Patient received in bed from procedure. Alert x 4. Vital signs taken and stable. No complaints of pain or discomfort. Call light within reach at all times.

## 2023-09-07 NOTE — PLAN OF CARE
Denies pain, nausea. IVF running per MD order. Pt tolerated dinner. NPO at 0000, plan for EGD in am. MD notified about CIWA <6 for over 24hrs. CIWA continued per MD. Safety precautions in place, call light within reach. Problem: Patient Centered Care  Goal: Patient preferences are identified and integrated in the patient's plan of care  Description: Interventions:  - What would you like us to know as we care for you?  Live at home with family  - Provide timely, complete, and accurate information to patient/family  - Incorporate patient and family knowledge, values, beliefs, and cultural backgrounds into the planning and delivery of care  - Encourage patient/family to participate in care and decision-making at the level they choose  - Honor patient and family perspectives and choices  Outcome: Progressing     Problem: Patient/Family Goals  Goal: Patient/Family Long Term Goal  Description: Patient's Long Term Goal: go home    Interventions:  - Monitor vital signs  - Monitor appropriate labs  - Monitor blood glucose levels  - Administer medications per order  - Pain management as needed  - Follow MD orders  - Diagnostics per orders  - Discharge planning     - See additional Care Plan goals for specific interventions  Outcome: Progressing  Goal: Patient/Family Short Term Goal  Description: Patient's Short Term Goal: to be nausea free    Interventions:   - PRN meds  -hydration  - See additional Care Plan goals for specific interventions  Outcome: Progressing     Problem: GASTROINTESTINAL - ADULT  Goal: Minimal or absence of nausea and vomiting  Description: INTERVENTIONS:  - Maintain adequate hydration with IV or PO as ordered and tolerated  - Nasogastric tube to low intermittent suction as ordered  - Evaluate effectiveness of ordered antiemetic medications  - Provide nonpharmacologic comfort measures as appropriate  - Advance diet as tolerated, if ordered  - Obtain nutritional consult as needed  - Evaluate fluid balance  Outcome: Progressing     Problem: Diabetes/Glucose Control  Goal: Glucose maintained within prescribed range  Description: INTERVENTIONS:  - Monitor Blood Glucose as ordered  - Assess for signs and symptoms of hyperglycemia and hypoglycemia  - Administer ordered medications to maintain glucose within target range  - Assess barriers to adequate nutritional intake and initiate nutrition consult as needed  - Instruct patient on self management of diabetes  Outcome: Progressing

## 2023-09-07 NOTE — OPERATIVE REPORT
ESOPHAGOGASTRODUODENOSCOPY (EGD) REPORT    Helen Robertson     1965 Age 62year old   PCP None Pcp Endoscopist Carisa Awad MD     Date of procedure: 23    Procedure: EGD w/biopsy    Pre-operative diagnosis: abnormal CT esophagus, n/v     Post-operative diagnosis: see impression    Medications: MAC    Complications: none    Procedure:  Informed consent was obtained from the patient after the risks of the procedure were discussed, including but not limited to bleeding, perforation, aspiration, infection, or possibility of a missed lesion. After discussions of the risks/benefits and alternatives to this procedure, as well as the planned sedation, the patient was placed in the left lateral decubitus position and begun on continuous blood pressure pulse oximetry and EKG monitoring and this was maintained throughout the procedure. Once an adequate level of sedation was obtained a bite block was placed. Then the lubricated tip of the [id number] diagnostic video upper endoscope was inserted and advanced using direct visualization into the posterior pharynx and ultimately into the esophagus, stomach, and duodenum. Complications: None    Findings:      1. Esophagus: The squamocolumnar junction was noted at 37 cm and appeared normal. The diaphragmatic pinch was noted noted at 37 cm from the incisors. LA grade C erosive esophagitis in the distal esophagus with a 1 cm clean based ulcer. Normal appearing esophagus proximal to this region. 2. Stomach: We then entered the stomach. Gastric mucosa appeared with moderate erythema at the cardia consistent with emetogenic injury. Remainder of gastric mucosa normal. No ulcerations. There was no evidence of any luminal or submucosal masses. A retroflexed view allowed examination of the angularis and fundus and these areas appeared normal with a non-patulous cardia. No hiatal hernia seen.  Random biopsies of the stomach (body, antrum, cardia, and incisura) were taken with cold forceps for histology. 3. Duodenum: Moderate erythema in the duodenal bulb. The duodenal mucosa appeared normal in the 2nd portion of the duodenum. We then withdrew the instrument from the patient who tolerated the procedure well. Impression:   1. LA grade C erosive esophagitis  2. Gastric cardia erythema, biopsied  3. Duodenal bulb erythema    Recommend:  1. Follow-up pathology  2. Continue ppi bid  3. May resume diet  4. Etoh cessation  5. GI to sign off, he may follow-up in gi clinic after discharge     >>>If tissue was sampled/removed and you have not received your pathology results either by phone or letter within 2 weeks, please call our office at .     Specimens:  Gastric     Blood loss: <1 ml      Edis Llanos MD  [facility], [facility]

## 2023-09-07 NOTE — PLAN OF CARE
Problem: Patient Centered Care  Goal: Patient preferences are identified and integrated in the patient's plan of care  Description: Interventions:  - What would you like us to know as we care for you?  Live at home with family  - Provide timely, complete, and accurate information to patient/family  - Incorporate patient and family knowledge, values, beliefs, and cultural backgrounds into the planning and delivery of care  - Encourage patient/family to participate in care and decision-making at the level they choose  - Honor patient and family perspectives and choices  Outcome: Progressing     Problem: Patient/Family Goals  Goal: Patient/Family Long Term Goal  Description: Patient's Long Term Goal: go home    Interventions:  - Monitor vital signs  - Monitor appropriate labs  - Monitor blood glucose levels  - Administer medications per order  - Pain management as needed  - Follow MD orders  - Diagnostics per orders  - Discharge planning     - See additional Care Plan goals for specific interventions  Outcome: Progressing  Goal: Patient/Family Short Term Goal  Description: Patient's Short Term Goal: to be nausea free    Interventions:   - PRN meds  -hydration  - See additional Care Plan goals for specific interventions  Outcome: Progressing     Problem: GASTROINTESTINAL - ADULT  Goal: Minimal or absence of nausea and vomiting  Description: INTERVENTIONS:  - Maintain adequate hydration with IV or PO as ordered and tolerated  - Nasogastric tube to low intermittent suction as ordered  - Evaluate effectiveness of ordered antiemetic medications  - Provide nonpharmacologic comfort measures as appropriate  - Advance diet as tolerated, if ordered  - Obtain nutritional consult as needed  - Evaluate fluid balance  Outcome: Progressing     Problem: Diabetes/Glucose Control  Goal: Glucose maintained within prescribed range  Description: INTERVENTIONS:  - Monitor Blood Glucose as ordered  - Assess for signs and symptoms of hyperglycemia and hypoglycemia  - Administer ordered medications to maintain glucose within target range  - Assess barriers to adequate nutritional intake and initiate nutrition consult as needed  - Instruct patient on self management of diabetes  Outcome: Progressing     Patient is presently resting in the bed. Currently NPO for EGD procedure on today. Vital signs taken and stable. Intravenous fluids infusing and tolerated. No complaints of pain or discomfort. Ciwa's are monitored every 2 hours per protocol. Patient receiving potassium and magnesium replacement per protocol. Call light within reach at all times.

## 2023-09-08 ENCOUNTER — PATIENT OUTREACH (OUTPATIENT)
Dept: CASE MANAGEMENT | Age: 58
End: 2023-09-08

## 2023-09-08 NOTE — PAYOR COMM NOTE
--------------  DISCHARGE REVIEW    Payor: [employer] P[employer]  Subscriber #:  [id number]  Authorization Number: [id number]    Admit date: 23  Admit time:   5:56 PM  Discharge Date: 2023  6:35 PM     Admitting Physician: Aruna Aguilar MD  Attending Physician:  No att. providers found  Primary Care Physician: Pcp, None          Discharge Summary Notes        Discharge Summary signed by Adriana Valentine MD at 2023  5:13 PM       Author: Adriana Valentine MD Specialty: HOSPITALIST Author Type: Physician    Filed: 2023  5:13 PM Date of Service: 2023  5:07 PM Status: Signed    : Adriana Valentine MD (Physician)         [facility]    Discharge Summary    Camilo Mcclellan Patient Status:  Inpatient    1965 MRN [id number]   Location [facility] 5SW/SE Attending Adriana Valentine MD    Day # 2 PCP None Pcp     Date of Admission: 2023 Disposition: Home or Self Care     Date of Discharge: 23      Admitting Diagnosis: Dehydration [E86.0]  Alcohol abuse [F10.10]  Leukocytosis, unspecified type [D72.829]  Nausea and vomiting, unspecified vomiting type [R11.2]    Hospital Discharge Diagnoses:  Alcohol Abuse    Lace+ Score: 28  59-90 High Risk  29-58 Medium Risk  0-28   Low Risk. TCM Follow-Up Recommendation:  LACE < 29: Low Risk of readmission after discharge from the hospital. No TCM follow-up needed. Problem List: Patient Active Problem List:     Nausea and vomiting, unspecified vomiting type     Dehydration     Alcohol abuse     Leukocytosis, unspecified type      Reason for Admission:   Intractable nausea and emesis  Leukocytosis  Alcohol Abuse  Thrombocytopenia    Physical Exam:   N/a      History of Present Illness:   Per Dr. Hector Zee  Patient is a 45-year-old [facility] male who has been binge drinking hard liquor for the last 15 days, last drink was last night. This morning, he started having retching nausea, vomiting, and dry heaves.  He came into the emergency department for evaluation. CBC showed white blood cell count of 24.6, hemoglobin hemoconcentrated at 17.9, left shift. Chemistry showed glucose 197, sodium 133, potassium 3.8, chloride 91, bicarb 13, anion gap 29, BUN 21 and creatinine 1.25. Liver function tests: AST and ALT 47 and 75, alkaline phosphatase 151. Urinalysis showed ketones, but no evidence of urinary tract infection. CT scan of the abdomen showed distal esophageal reflux changes with thickening but no other acute findings. Chest x-ray showed no acute infiltrates. Procalcitonin is still pending. Magnesium is still pending. Hospital Course:   Intractable nausea and emesis  -Likely related to heavy alcohol intake  -Appreciate GI consultation  -Tolerating diet  -cont IV PPI--> oral PPI--> bid on dc  -CT abdomen shows diffuse distal esophageal wall thickening  -upper endoscopy today shows grade c erosive esophagitis     Leukocytosis  -WBC 24 on admit-->14 now normalized  -low  grade fever   -repeat cxr pa/lat clear no pneumonia  -low grade fever  -started augmentin empirically, will stop  -+ bacteremia 1/2 staph not aureus, likely contaminant  -repeat cultures ngtsd     Alcohol Abuse  Counseled on etoh cessation    Thrombocytopenia  -likely related to etoh  -op f/u    Consultations: Gi    Procedures: endoscopy    Complications: n/a    Discharge Condition: Good    Discharge Medications:      Discharge Medications        START taking these medications        Instructions Prescription details   folic acid 1 MG Tabs  Commonly known as: Folvite  Start taking on: September 8, 2023      Take 1 tablet (1 mg total) by mouth daily. Quantity: 30 tablet  Refills: 0     multivitamin with minerals Tabs  Start taking on: September 8, 2023      Take 1 tablet by mouth daily. Quantity: 30 tablet  Refills: 0     pantoprazole 40 MG Tbec  Commonly known as: Protonix      Take 1 tablet (40 mg total) by mouth 2 (two) times daily before meals.    Quantity: 60 tablet  Refills: 0     thiamine 100 MG Tabs  Commonly known as: Vitamin B1  Start taking on: September 8, 2023      Take 1 tablet (100 mg total) by mouth daily. Quantity: 30 tablet  Refills: 0            CONTINUE taking these medications        Instructions Prescription details   atorvastatin 20 MG Tabs  Commonly known as: Lipitor       Refills: 2     Fluarix Quadrivalent 0.5 mL Tamy  Generic drug: influenza vaccine split quad      ADM 0.5ML IM UTD   Refills: 0     metFORMIN 500 MG Tabs  Commonly known as: Glucophage      TK 1 T PO  BID   Refills: 2     pioglitazone 15 MG Tabs  Commonly known as: Actos       Refills: 2            STOP taking these medications      diclofenac 50 MG Tabs  Commonly known as: Cataflam        ibuprofen 800 MG Tabs  Commonly known as: Motrin                  Where to Get Your Medications        These medications were sent to [place] [id number] [id number] [facility], [address] [place] [address] AT [address] [place] [address], [phone], [address], [address] [facility] [address] [address]      Phone: [phone]   folic acid 1 MG Tabs  multivitamin with minerals Tabs  pantoprazole 40 MG Tbec  thiamine 100 MG Tabs         Follow up Visits:  Follow-up with pcp in 1 week    Follow up Labs: n/a     Other Discharge Instructions: follow-up with ROULA Enriquez MD  9/7/2023  5:07 PM    > 35 min       Electronically signed by Ai Pro MD on 9/7/2023  5:13 PM         REVIEWER COMMENTS

## 2023-09-14 ENCOUNTER — TELEPHONE (OUTPATIENT)
Facility: CLINIC | Age: 58
End: 2023-09-14

## 2023-09-15 NOTE — TELEPHONE ENCOUNTER
----- Message from CALIXTO Edwards sent at 9/14/2023  3:57 PM CDT -----  Please let patient know biopsies from his recent EGD with Dr Bora Mariscal are negative for infections, inflammation. NO signs of pre cancer or cancer. No further testing is needed at this time, if he has any concerning symptoms he can follow up with us in the office anytime.  Mona Richter Fail

## 2023-09-19 NOTE — TELEPHONE ENCOUNTER
Left message to call back (3rd attempt)    No response letter generated and mailed to pt's home address. TE closed.

## 2023-10-04 ENCOUNTER — TELEPHONE (OUTPATIENT)
Facility: CLINIC | Age: 58
End: 2023-10-04

## 2023-10-04 NOTE — TELEPHONE ENCOUNTER
ote  ----- Message from CALIXTO Muir sent at 9/14/2023  3:57 PM CDT -----  Please let patient know biopsies from his recent EGD with Dr Aviva Strickland are negative for infections, inflammation. NO signs of pre cancer or cancer. No further testing is needed at this time, if he has any concerning symptoms he can follow up with us in the office anytime.  Mona Holder

## 2024-06-06 ENCOUNTER — APPOINTMENT (OUTPATIENT)
Dept: CT IMAGING | Facility: HOSPITAL | Age: 59
End: 2024-06-06
Attending: EMERGENCY MEDICINE
Payer: COMMERCIAL

## 2024-06-06 ENCOUNTER — HOSPITAL ENCOUNTER (EMERGENCY)
Facility: HOSPITAL | Age: 59
Discharge: HOME | End: 2024-06-07
Attending: EMERGENCY MEDICINE
Payer: COMMERCIAL

## 2024-06-06 VITALS
BODY MASS INDEX: 25.9 KG/M2 | RESPIRATION RATE: 17 BRPM | DIASTOLIC BLOOD PRESSURE: 78 MMHG | HEART RATE: 82 BPM | TEMPERATURE: 99 F | SYSTOLIC BLOOD PRESSURE: 122 MMHG | OXYGEN SATURATION: 94 % | HEIGHT: 67 IN | WEIGHT: 165 LBS

## 2024-06-06 DIAGNOSIS — K76.0 HEPATIC STEATOSIS: ICD-10-CM

## 2024-06-06 DIAGNOSIS — R10.84 ABDOMINAL PAIN, GENERALIZED: ICD-10-CM

## 2024-06-06 DIAGNOSIS — E87.1 HYPONATREMIA: ICD-10-CM

## 2024-06-06 DIAGNOSIS — K20.90 ESOPHAGITIS: ICD-10-CM

## 2024-06-06 DIAGNOSIS — R11.2 NAUSEA AND VOMITING, UNSPECIFIED VOMITING TYPE: Primary | ICD-10-CM

## 2024-06-06 LAB
ALBUMIN SERPL-MCNC: 5.2 G/DL (ref 3.2–4.8)
ALBUMIN/GLOB SERPL: 1.6 {RATIO} (ref 1–2)
ALP LIVER SERPL-CCNC: 111 U/L
ALT SERPL-CCNC: 16 U/L
ANION GAP SERPL CALC-SCNC: 11 MMOL/L (ref 0–18)
AST SERPL-CCNC: 19 U/L (ref ?–34)
BASOPHILS # BLD AUTO: 0.04 X10(3) UL (ref 0–0.2)
BASOPHILS NFR BLD AUTO: 0.2 %
BILIRUB SERPL-MCNC: 1.5 MG/DL (ref 0.3–1.2)
BUN BLD-MCNC: 19 MG/DL (ref 9–23)
BUN/CREAT SERPL: 20.9 (ref 10–20)
CALCIUM BLD-MCNC: 10 MG/DL (ref 8.7–10.4)
CHLORIDE SERPL-SCNC: 90 MMOL/L (ref 98–112)
CO2 SERPL-SCNC: 27 MMOL/L (ref 21–32)
CREAT BLD-MCNC: 0.91 MG/DL
DEPRECATED RDW RBC AUTO: 38.6 FL (ref 35.1–46.3)
EGFRCR SERPLBLD CKD-EPI 2021: 98 ML/MIN/1.73M2 (ref 60–?)
EOSINOPHIL # BLD AUTO: 0.04 X10(3) UL (ref 0–0.7)
EOSINOPHIL NFR BLD AUTO: 0.2 %
ERYTHROCYTE [DISTWIDTH] IN BLOOD BY AUTOMATED COUNT: 12.8 % (ref 11–15)
GLOBULIN PLAS-MCNC: 3.2 G/DL (ref 2–3.5)
GLUCOSE BLD-MCNC: 235 MG/DL (ref 70–99)
GLUCOSE BLDC GLUCOMTR-MCNC: 238 MG/DL (ref 70–99)
HCT VFR BLD AUTO: 47.4 %
HGB BLD-MCNC: 16.7 G/DL
IMM GRANULOCYTES # BLD AUTO: 0.1 X10(3) UL (ref 0–1)
IMM GRANULOCYTES NFR BLD: 0.5 %
LIPASE SERPL-CCNC: 39 U/L (ref 13–75)
LYMPHOCYTES # BLD AUTO: 2.54 X10(3) UL (ref 1–4)
LYMPHOCYTES NFR BLD AUTO: 13 %
MCH RBC QN AUTO: 29.3 PG (ref 26–34)
MCHC RBC AUTO-ENTMCNC: 35.2 G/DL (ref 31–37)
MCV RBC AUTO: 83.2 FL
MONOCYTES # BLD AUTO: 1.43 X10(3) UL (ref 0.1–1)
MONOCYTES NFR BLD AUTO: 7.3 %
NEUTROPHILS # BLD AUTO: 15.44 X10 (3) UL (ref 1.5–7.7)
NEUTROPHILS # BLD AUTO: 15.44 X10(3) UL (ref 1.5–7.7)
NEUTROPHILS NFR BLD AUTO: 78.8 %
OSMOLALITY SERPL CALC.SUM OF ELEC: 276 MOSM/KG (ref 275–295)
PLATELET # BLD AUTO: 188 10(3)UL (ref 150–450)
POTASSIUM SERPL-SCNC: 3.9 MMOL/L (ref 3.5–5.1)
PROT SERPL-MCNC: 8.4 G/DL (ref 5.7–8.2)
RBC # BLD AUTO: 5.7 X10(6)UL
SODIUM SERPL-SCNC: 128 MMOL/L (ref 136–145)
WBC # BLD AUTO: 19.6 X10(3) UL (ref 4–11)

## 2024-06-06 PROCEDURE — 99284 EMERGENCY DEPT VISIT MOD MDM: CPT

## 2024-06-06 PROCEDURE — 83690 ASSAY OF LIPASE: CPT | Performed by: EMERGENCY MEDICINE

## 2024-06-06 PROCEDURE — 96374 THER/PROPH/DIAG INJ IV PUSH: CPT

## 2024-06-06 PROCEDURE — S0028 INJECTION, FAMOTIDINE, 20 MG: HCPCS | Performed by: EMERGENCY MEDICINE

## 2024-06-06 PROCEDURE — 85025 COMPLETE CBC W/AUTO DIFF WBC: CPT | Performed by: EMERGENCY MEDICINE

## 2024-06-06 PROCEDURE — 80053 COMPREHEN METABOLIC PANEL: CPT | Performed by: EMERGENCY MEDICINE

## 2024-06-06 PROCEDURE — 96361 HYDRATE IV INFUSION ADD-ON: CPT

## 2024-06-06 PROCEDURE — 74177 CT ABD & PELVIS W/CONTRAST: CPT | Performed by: EMERGENCY MEDICINE

## 2024-06-06 PROCEDURE — 96375 TX/PRO/DX INJ NEW DRUG ADDON: CPT

## 2024-06-06 PROCEDURE — 82962 GLUCOSE BLOOD TEST: CPT

## 2024-06-06 RX ORDER — ONDANSETRON 2 MG/ML
4 INJECTION INTRAMUSCULAR; INTRAVENOUS ONCE
Status: COMPLETED | OUTPATIENT
Start: 2024-06-06 | End: 2024-06-06

## 2024-06-06 RX ORDER — FAMOTIDINE 10 MG/ML
20 INJECTION, SOLUTION INTRAVENOUS ONCE
Status: COMPLETED | OUTPATIENT
Start: 2024-06-06 | End: 2024-06-06

## 2024-06-07 LAB
BILIRUB UR QL: NEGATIVE
CLARITY UR: CLEAR
COLOR UR: COLORLESS
GLUCOSE UR-MCNC: 50 MG/DL
HGB UR QL STRIP.AUTO: NEGATIVE
KETONES UR-MCNC: 40 MG/DL
LEUKOCYTE ESTERASE UR QL STRIP.AUTO: NEGATIVE
NITRITE UR QL STRIP.AUTO: NEGATIVE
PH UR: 7 [PH] (ref 5–8)
SP GR UR STRIP: >1.03 (ref 1–1.03)
UROBILINOGEN UR STRIP-ACNC: NORMAL

## 2024-06-07 PROCEDURE — 81003 URINALYSIS AUTO W/O SCOPE: CPT | Performed by: EMERGENCY MEDICINE

## 2024-06-07 RX ORDER — SUCRALFATE 1 G/1
1 TABLET ORAL
Qty: 90 TABLET | Refills: 0 | Status: SHIPPED | OUTPATIENT
Start: 2024-06-07

## 2024-06-07 RX ORDER — ONDANSETRON 4 MG/1
4 TABLET, ORALLY DISINTEGRATING ORAL EVERY 6 HOURS PRN
Qty: 10 TABLET | Refills: 0 | Status: SHIPPED | OUTPATIENT
Start: 2024-06-07 | End: 2024-06-14

## 2024-06-07 NOTE — DISCHARGE INSTRUCTIONS
Thank you for seeking care at [facility]  You have been seen and evaluated for abdominal pain and discomfort.    In the emergency department, you had labs, urine and CT done   Your testing did not show severe findings, except as noted: Esophageal inflammation also known as esophagitis    Because you had a CT scan with contrast today please hold your metformin for 48 hours. IV dye can interact with metformin.     Read all instructions provided.    Remember, your care process does not end after your visit today. Please follow-up with your doctor within 1-2 days for a follow-up visit to ensure your symptoms are improving, to see if you need any further evaluation/testing, or to evaluate for any alternate diagnoses.     Return to the emergency department if you develop severe abdominal pain, severe nausea and vomiting to the point where you are unable to keep down fluids, if you develop chest pain or difficulty breathing, blood in your stool, dizziness or fainting, or if you develop any other new or concerning symptoms as these could be signs of more serious medical illness. Try to stay well hydrated.

## 2024-06-07 NOTE — ED QUICK NOTES
Patient tolerating fluid intake. Denies nausea or vomiting. Patient verbalizes understanding of instructions, discharged in stable condition.

## 2024-06-07 NOTE — ED INITIAL ASSESSMENT (HPI)
Pt arrived via [facility] from home c/o vomiting, abd pain x 2 days. Pt denies urinary symptoms or hx of abd sx.

## 2024-06-07 NOTE — ED PROVIDER NOTES
[facility] Emergency Department Note  Patient: Ben Becerra Age: 58 year old Sex: male      MRN: [id number]  : 1965    Patient Seen in: [facility] Emergency Department    History     Chief Complaint   Patient presents with    Vomiting     Stated Complaint:     History obtained from: patient    58M hx of GERD, HLD, DM presents to the ED with complaints of abdominal pain, vomiting.  Patient states that he drank some extra drinks of alcohol over this past weekend, and states for the past 2 days he has been experiencing severe nausea with up to 30 episodes per day of initially nonbloody emesis though has occasionally seen a streak of blood in his emesis today.  He denies any bilious emesis.  Denies diarrhea and does not think he has been passing gas.  He does report some associated generalized abdominal discomfort.  No fevers or chills.  No cough.  No recent travel.  No known sick contacts.  States has not kept anything down secondary to symptoms    Review of Systems:  Review of Systems  Positive for stated complaint: . Constitutional and vital signs reviewed. All other systems reviewed and negative except as noted above.    Patient History:  Past Medical History:    Diabetes (HCC)    Esophageal reflux    Hyperlipidemia       History reviewed. No pertinent surgical history.     No family history on file.    Specific Social Determinants of Health:   Social History     Socioeconomic History    Marital status:    Tobacco Use    Smoking status: Every Day     Current packs/day: 0.25     Average packs/day: 0.3 packs/day for 30.0 years (7.5 ttl pk-yrs)     Types: Cigarettes    Smokeless tobacco: Never   Vaping Use    Vaping status: Never Used   Substance and Sexual Activity    Alcohol use: Yes     Comment: has been drinking for the last 15 days, 1 pint of whiskey    Drug use: No           PSFH elements reviewed from today and agreed except as otherwise stated in HPI.    Physical Exam     ED Triage Vitals  [06/06/24 2054]   /84   Pulse 110   Resp 18   Temp 98.8 °F (37.1 °C)   Temp src Oral   SpO2 95 %   O2 Device None (Room air)       Current:/78   Pulse 82   Temp 98.8 °F (37.1 °C) (Oral)   Resp 17   Ht 170.2 cm (5' 7\")   Wt 74.8 kg   SpO2 94%   BMI 25.84 kg/m²         Physical Exam  Vitals and nursing note reviewed.   Constitutional:       General: He is not in acute distress.     Appearance: He is not ill-appearing.   HENT:      Head: Normocephalic and atraumatic.      Mouth/Throat:      Mouth: Mucous membranes are dry.   Eyes:      Conjunctiva/sclera: Conjunctivae normal.   Cardiovascular:      Rate and Rhythm: Normal rate and regular rhythm.      Heart sounds: No murmur heard.  Pulmonary:      Effort: Pulmonary effort is normal. No respiratory distress.      Breath sounds: No wheezing or rales.   Abdominal:      General: There is no distension.      Palpations: Abdomen is soft. There is no mass.      Tenderness: There is no abdominal tenderness. There is no right CVA tenderness, left CVA tenderness, guarding or rebound.   Musculoskeletal:      Right lower leg: No edema.      Left lower leg: No edema.   Skin:     General: Skin is warm and dry.      Capillary Refill: Capillary refill takes less than 2 seconds.   Neurological:      General: No focal deficit present.      Mental Status: He is alert.         ED Course   Labs:   Labs Reviewed   COMP METABOLIC PANEL (14) - Abnormal; Notable for the following components:       Result Value    Glucose 235 (*)     Sodium 128 (*)     Chloride 90 (*)     BUN/CREA Ratio 20.9 (*)     Bilirubin, Total 1.5 (*)     Total Protein 8.4 (*)     Albumin 5.2 (*)     All other components within normal limits   URINALYSIS WITH CULTURE REFLEX - Abnormal; Notable for the following components:    Urine Color Colorless (*)     Spec Gravity >1.030 (*)     Glucose Urine 50 (*)     Ketones Urine 40 (*)     Protein Urine Trace (*)     All other components within normal limits    POCT GLUCOSE - Abnormal; Notable for the following components:    POC Glucose  238 (*)     All other components within normal limits   CBC W/ DIFFERENTIAL - Abnormal; Notable for the following components:    WBC 19.6 (*)     Neutrophil Absolute Prelim 15.44 (*)     Neutrophil Absolute 15.44 (*)     Monocyte Absolute 1.43 (*)     All other components within normal limits   LIPASE - Normal   CBC WITH DIFFERENTIAL WITH PLATELET    Narrative:     The following orders were created for panel order CBC With Differential With Platelet.  Procedure                               Abnormality         Status                     ---------                               -----------         ------                     CBC W/ DIFFERENTIAL[[id number]]          Abnormal            Final result                 Please view results for these tests on the individual orders.   SCAN SLIDE     Radiology findings:  I personally reviewed the images.   No results found.    Cardiac Monitor: Interpreted by me.   Pulse Readings from Last 1 Encounters:   06/06/24 82   , sinus,       MDM   This patient presents with abdominal pain, severe nausea and vomiting for 2-3 days. Exam is as above, pt with dry oral mucosa     Differential diagnosis includes:    Upper GI pathology including gastritis, PUD, cholecystitis, choledocholithiasis, pancreatitis, biliary colic, SBO     Lower suspicion for low GI pathology including diverticulitis, appendicitis, intraabdominal abscess, volvulus     Very low suspicion for  pathology including testicular torsion, kidney stone, pyelonephritis or UTI     Plan: will obtain cbc, cmp, lipase, CTAP for further disposition.  Patient does not have significant reproducible tenderness however reports abd discomfort and given his severe vomiting and leukocytosis and he feels CT scan indicated to ensure no obstruction or acute intra-abdominal pathology contributing to his symptoms    ED Course as of 06/07/24  0031  ------------------------------------------------------------  Time: 06/06 2106  Value: WBC(!): 19.6  Comment: Pt does have significant leukocytosis with left shift, may be 2/2 vomiting  ------------------------------------------------------------  Time: 06/06 2108  Value: POC GLUCOSE(!): 238  Comment: (Reviewed)  ------------------------------------------------------------  Time: 06/06 2227  Value: Sodium(!): 128  Comment: IV NS bolus ordered. Suspect 2/2 dehydration.   ------------------------------------------------------------  Time: 06/06 2227  Comment: Lipase normal.   ------------------------------------------------------------  Time: 06/06 2312  Value: CT ABDOMEN+PELVIS(CONTRAST ONLY)(CPT=74433)  Comment: IMPRESSION:     Thickening of the distal esophagus with mild stranding, suggestive of esophagitis.    Mild bilateral perirenal stranding, nonspecific.  Correlate with UA.    No other acute findings in the abdomen and pelvis.  Hepatic steatosis.  Mild atherosclerosis.    ------------------------------------------------------------  Time: 06/06 2312  Value: Carbon Dioxide, Total: 27.0  Comment: (Reviewed)  ------------------------------------------------------------  Time: 06/06 2329  Comment: Patient reassessed, sitting in no distress, states is feeling slightly improved after medicines here, updated him that CT is showing distal esophageal thickening and consistent with esophagitis with mild pararenal stranding, no other acute findings.  Urine pending.  They have additional dose of fluids given his dehydration but plan for likely discharge pending p.o. challenge.  He does take PPI.  ------------------------------------------------------------  Time: 06/07 0029  Comment: PT has tolerated p.o., no vomiting in the ER.  Stable for discharge.  Counseled him to follow-up with GI as outpatient and continue to push fluids.  Zofran and Carafate ordered for outpatient.  Patient verbalized understanding  comfortable discharge plan.  I did  him he should follow-up with primary doctor in 2 to 3 days and follow-up with GI within 1 week particularly given his history of esophagitis            Procedures:  Procedures        Disposition and Plan     Clinical Impression:  1. Nausea and vomiting, unspecified vomiting type    2. Abdominal pain, generalized    3. Esophagitis    4. Hyponatremia    5. Hepatic steatosis        Disposition:  Discharge    Follow-up:  Osmar Ladd MD  [address] [facility] [address]  [facility] [id number] [phone]    Schedule an appointment as soon as possible for a visit in 1 week(s)      [facility] Emergency Department  [address]  [facility] [address] [phone]  Go to  If symptoms worsen, immediately    Corazon Bojorquez MD  [address] [id number] [phone]    Schedule an appointment as soon as possible for a visit in 2 day(s)  As needed otherwise follow up with your own primary doctor      Medications Prescribed:  Current Discharge Medication List        START taking these medications    Details   sucralfate 1 g Oral Tab Take 1 tablet (1 g total) by mouth 3 (three) times daily before meals as needed.  Qty: 90 tablet, Refills: 0      ondansetron 4 MG Oral Tablet Dispersible Take 1 tablet (4 mg total) by mouth every 6 (six) hours as needed for Nausea.  Qty: 10 tablet, Refills: 0               This note may have been created using voice dictation technology and may include inadvertent errors.      Zamzam Nobles, DO  Attending Physician   Emergency Medicine

## 2024-06-25 ENCOUNTER — APPOINTMENT (OUTPATIENT)
Dept: CT IMAGING | Facility: HOSPITAL | Age: 59
End: 2024-06-25
Attending: STUDENT IN AN ORGANIZED HEALTH CARE EDUCATION/TRAINING PROGRAM

## 2024-06-25 PROCEDURE — 74177 CT ABD & PELVIS W/CONTRAST: CPT | Performed by: STUDENT IN AN ORGANIZED HEALTH CARE EDUCATION/TRAINING PROGRAM

## (undated) DIAGNOSIS — D72.820 LYMPHOCYTOSIS: Primary | ICD-10-CM

## (undated) DEVICE — YANKAUER SUCTION INSTRUMENT NO CONTROL VENT, BULB TIP, CLEAR: Brand: YANKAUER

## (undated) DEVICE — KIT ENDO ORCAPOD 160/180/190

## (undated) DEVICE — KIT CLEAN ENDOKIT 1.1OZ GOWNX2

## (undated) DEVICE — 60 ML SYRINGE REGULAR TIP: Brand: MONOJECT

## (undated) DEVICE — MEDI-VAC NON-CONDUCTIVE SUCTION TUBING: Brand: CARDINAL HEALTH

## (undated) DEVICE — Device: Brand: DUAL NARE NASAL CANNULAE FEMALE LUER CON 7FT O2 TUBE

## (undated) DEVICE — MEDI-VAC NON-CONDUCTIVE SUCTION TUBING 6MM X 1.8M (6FT.) L: Brand: CARDINAL HEALTH

## (undated) DEVICE — FORCEP RADIAL JAW 4

## (undated) DEVICE — CONMED SCOPE SAVER BITE BLOCK, 20X27 MM: Brand: SCOPE SAVER

## (undated) NOTE — LETTER
[address] [place] [address]  Authorization for Invasive Procedures  Date: ***           Time: ***    {[facility] ivs consent:[id number]}

## (undated) NOTE — LETTER
9/19/2023              Rohini Mosley        [address]         Dear Tino Hastings,    This letter is to inform you that our office has made several attempts to reach you by phone without success. We were attempting to contact you by phone regarding biopsy results of your recent endoscopy. Please contact our office at the number listed below as soon as you receive this letter to discuss this issue and to make the necessary changes in our system to your contact information. Thank you for your cooperation.         Sincerely,    CALIXTO Hernandes  [facility] [address] [phone]

## (undated) NOTE — LETTER
[address] [place] [address]  Authorization for Invasive Procedures  Date: 9/6/2023         Time: 6:44PM    I hereby authorize Dr. Davion Mary, my physician and his/her assistants (if applicable), which may include medical students, residents, and/or fellows, to perform the following surgical operation/ procedure and administer such anesthesia as may be determined necessary by my physician: Esophagogastroduodenoscopy on Carofo Sue  2. I recognize that during the surgical operation/procedure, unforeseen conditions may necessitate additional or different procedures than those listed above. I, therefore, further authorize and request that the above-named surgeon, assistants, or designees perform such procedures as are, in their judgment, necessary and desirable. 3.   My surgeon/physician has discussed prior to my surgery the potential benefits, risks and side effects of this procedure; the likelihood of achieving goals; and potential problems that might occur during recuperation. They also discussed reasonable alternatives to the procedure, including risks, benefits, and side effects related to the alternatives and risks related to not receiving this procedure. I have had all my questions answered and I acknowledge that no guarantee has been made as to the result that may be obtained. 4.   Should the need arise during my operation/procedure, which includes change of level of care prior to discharge, I also consent to the administration of blood and/or blood products. Further, I understand that despite careful testing and screening of blood or blood products by collecting agencies, I may still be subject to ill effects as a result of receiving a blood transfusion and/or blood products.   The following are some, but not all, of the potential risks that can occur: fever and allergic reactions, hemolytic reactions, transmission of diseases such as Hepatitis, AIDS and Cytomegalovirus (CMV) and fluid overload. In the event that I wish to have an autologous transfusion of my own blood, or a directed donor transfusion, I will discuss this with my physician. Check only if Refusing Blood or Blood Products  I understand refusal of blood or blood products as deemed necessary by my physician may have serious consequences to my condition to include possible death. I hereby assume responsibility for my refusal and release the hospital, its personnel, and my physicians from any responsibility for the consequences of my refusal.         o  Refuse         5. I authorize the use of any specimen, organs, tissues, body parts or foreign objects that may be removed from my body during the operation/procedure for diagnosis, research or teaching purposes and their subsequent disposal by hospital authorities. I also authorize the release of specimen test results and/or written reports to my treating physician on the hospital medical staff or other referring or consulting physicians involved in my care, at the discretion of the Pathologist or my treating physician. 6.   I consent to the photographing or videotaping of the operations or procedures to be performed, including appropriate portions of my body for medical, scientific, or educational purposes, provided my identity is not revealed by the pictures or by descriptive texts accompanying them. If the procedure has been photographed/videotaped, the surgeon will obtain the original picture, image, videotape or CD. The hospital will not be responsible for storage, release or maintenance of the picture, image, tape or CD.    7.   I consent to the presence of a  or observers in the operating room as deemed necessary by my physician or their designees. 8.   I recognize that in the event my procedure results in extended X-Ray/fluoroscopy time, I may develop a skin reaction. 9.  If I have a Do Not Attempt Resuscitation (DNAR) order in place, that status will be suspended while in the operating room, procedural suite, and during the recovery period unless otherwise explicitly stated by me (or a person authorized to consent on my behalf). The surgeon or my attending physician will determine when the applicable recovery period ends for purposes of reinstating the DNAR order. 10. Patients having a sterilization procedure: I understand that if the procedure is successful the results will be permanent and it will therefore be impossible for me to inseminate, conceive, or bear children. I also understand that the procedure is intended to result in sterility, although the result has not been guaranteed. 11. I acknowledge that my physician has explained sedation/analgesia administration to me including the risk and benefits I consent to the administration of sedation/analgesia as may be necessary or desirable in the judgment of my physician. I CERTIFY THAT I HAVE READ AND FULLY UNDERSTAND THE ABOVE CONSENT TO OPERATION and/or OTHER PROCEDURE.        ____________________________________       _________________________________      ______________________________  Signature of Patient         Signature of Responsible Person        Printed Name of Responsible Person        ____________________________________      _________________________________      ______________________________       Signature of Witness          Relationship to Patient                       Date                                       Time    Patient Name: Javier Dwyer     : 1965                 Printed: 2023      Medical Record #: [id number]                      Page 1 of 2          [place] My signature below affirms that prior to the time of the procedure; I have explained to the patient and/or his/her legal representative, the risks and benefits involved in the proposed treatment and any reasonable alternative to the proposed treatment.  I have also explained the risks and benefits involved in refusal of the proposed treatment and alternatives to the proposed treatment and have answered the patient's questions.  If I have a significant financial interest in a co-management agreement or a significant financial interest in any product or implant, or other significant relationship used in this procedure/surgery, I have disclosed this and had a discussion with my patient.     _______________________________________________________________ _____________________________  Megan Mclaughlin of Physician)                                                                                         (Date)                                   (Time)    Patient Name: Tamar Amos     : 1965                 Printed: 2023      Medical Record #: [id number]                      Page 2 of 2

## (undated) NOTE — LETTER
[address]  Authorization for Surgical Operation and Procedure                                                                                           I hereby authorize Viji Linn MD, my physician and his/her assistants (if applicable), which may include medical students, residents, and/or fellows, to perform the following surgical operation/ procedure and administer such anesthesia as may be determined necessary by my physician: Operation/Procedure name (s) ESOPHAGOGASTRODUODENOSCOPY (EGD) on Mohini Garcia   2. I recognize that during the surgical operation/procedure, unforeseen conditions may necessitate additional or different procedures than those listed above. I, therefore, further authorize and request that the above-named surgeon, assistants, or designees perform such procedures as are, in their judgment, necessary and desirable. 3.   My surgeon/physician has discussed prior to my surgery the potential benefits, risks and side effects of this procedure; the likelihood of achieving goals; and potential problems that might occur during recuperation. They also discussed reasonable alternatives to the procedure, including risks, benefits, and side effects related to the alternatives and risks related to not receiving this procedure. I have had all my questions answered and I acknowledge that no guarantee has been made as to the result that may be obtained. 4.   Should the need arise during my operation/procedure, which includes change of level of care prior to discharge, I also consent to the administration of blood and/or blood products. Further, I understand that despite careful testing and screening of blood or blood products by collecting agencies, I may still be subject to ill effects as a result of receiving a blood transfusion and/or blood products.   The following are some, but not all, of the potential risks that can occur: fever and allergic reactions, hemolytic reactions, transmission of diseases such as Hepatitis, AIDS and Cytomegalovirus (CMV) and fluid overload. In the event that I wish to have an autologous transfusion of my own blood, or a directed donor transfusion, I will discuss this with my physician. Check only if Refusing Blood or Blood Products  I understand refusal of blood or blood products as deemed necessary by my physician may have serious consequences to my condition to include possible death. I hereby assume responsibility for my refusal and release the hospital, its personnel, and my physicians from any responsibility for the consequences of my refusal.    o  Refuse   5. I authorize the use of any specimen, organs, tissues, body parts or foreign objects that may be removed from my body during the operation/procedure for diagnosis, research or teaching purposes and their subsequent disposal by hospital authorities. I also authorize the release of specimen test results and/or written reports to my treating physician on the hospital medical staff or other referring or consulting physicians involved in my care, at the discretion of the Pathologist or my treating physician. 6.   I consent to the photographing or videotaping of the operations or procedures to be performed, including appropriate portions of my body for medical, scientific, or educational purposes, provided my identity is not revealed by the pictures or by descriptive texts accompanying them. If the procedure has been photographed/videotaped, the surgeon will obtain the original picture, image, videotape or CD. The hospital will not be responsible for storage, release or maintenance of the picture, image, tape or CD.    7.   I consent to the presence of a  or observers in the operating room as deemed necessary by my physician or their designees.     8.   I recognize that in the event my procedure results in extended X-Ray/fluoroscopy time, I may develop a skin reaction. 9. If I have a Do Not Attempt Resuscitation (DNAR) order in place, that status will be suspended while in the operating room, procedural suite, and during the recovery period unless otherwise explicitly stated by me (or a person authorized to consent on my behalf). The surgeon or my attending physician will determine when the applicable recovery period ends for purposes of reinstating the DNAR order. 10. Patients having a sterilization procedure: I understand that if the procedure is successful the results will be permanent and it will therefore be impossible for me to inseminate, conceive, or bear children. I also understand that the procedure is intended to result in sterility, although the result has not been guaranteed. 11. I acknowledge that my physician has explained sedation/analgesia administration to me including the risk and benefits I consent to the administration of sedation/analgesia as may be necessary or desirable in the judgment of my physician. I CERTIFY THAT I HAVE READ AND FULLY UNDERSTAND THE ABOVE CONSENT TO OPERATION and/or OTHER PROCEDURE.     _________________________________________ _________________________________     ___________________________________  Signature of Patient     Signature of Responsible Person                   Printed Name of Responsible Person                              _________________________________________ ______________________________        ___________________________________  Signature of Witness         Date  Time         Relationship to Patient    STATEMENT OF PHYSICIAN My signature below affirms that prior to the time of the procedure; I have explained to the patient and/or his/her legal representative, the risks and benefits involved in the proposed treatment and any reasonable alternative to the proposed treatment.  I have also explained the risks and benefits involved in refusal of the proposed treatment and alternatives to the proposed treatment and have answered the patient's questions.  If I have a significant financial interest in a co-management agreement or a significant financial interest in any product or implant, or other significant relationship used in this procedure/surgery, I have disclosed this and had a discussion with my patient.     _______________________________________________________________ _____________________________  Coit Kurt of Physician)                                                                                         (Date)                                   (Time)  Patient Name: Rohini Mosley    : 1965   Printed: 2023      Medical Record #: [id number]                                              Page 1 of 1